# Patient Record
Sex: MALE | Race: WHITE | NOT HISPANIC OR LATINO | URBAN - METROPOLITAN AREA
[De-identification: names, ages, dates, MRNs, and addresses within clinical notes are randomized per-mention and may not be internally consistent; named-entity substitution may affect disease eponyms.]

---

## 2017-02-27 PROBLEM — Z00.00 ENCOUNTER FOR PREVENTIVE HEALTH EXAMINATION: Status: ACTIVE | Noted: 2017-02-27

## 2017-03-03 ENCOUNTER — OUTPATIENT (OUTPATIENT)
Dept: OUTPATIENT SERVICES | Facility: HOSPITAL | Age: 62
LOS: 1 days | Discharge: HOME | End: 2017-03-03

## 2017-03-03 ENCOUNTER — APPOINTMENT (OUTPATIENT)
Dept: HEMATOLOGY ONCOLOGY | Facility: CLINIC | Age: 62
End: 2017-03-03

## 2017-03-03 VITALS
RESPIRATION RATE: 14 BRPM | DIASTOLIC BLOOD PRESSURE: 76 MMHG | SYSTOLIC BLOOD PRESSURE: 124 MMHG | HEIGHT: 71 IN | HEART RATE: 74 BPM | BODY MASS INDEX: 37.8 KG/M2 | WEIGHT: 270 LBS | TEMPERATURE: 96.4 F

## 2017-03-03 DIAGNOSIS — Z86.39 PERSONAL HISTORY OF OTHER ENDOCRINE, NUTRITIONAL AND METABOLIC DISEASE: ICD-10-CM

## 2017-03-03 DIAGNOSIS — C76.0 MALIGNANT NEOPLASM OF HEAD, FACE AND NECK: ICD-10-CM

## 2017-03-03 RX ORDER — SITAGLIPTIN AND METFORMIN HYDROCHLORIDE 50; 500 MG/1; MG/1
50-500 TABLET, FILM COATED ORAL
Refills: 0 | Status: ACTIVE | COMMUNITY

## 2017-03-03 RX ORDER — DEXAMETHASONE 4 MG/1
4 TABLET ORAL DAILY
Qty: 60 | Refills: 3 | Status: ACTIVE | COMMUNITY
Start: 2017-03-03 | End: 1900-01-01

## 2017-03-03 RX ORDER — ONDANSETRON 8 MG/1
8 TABLET, ORALLY DISINTEGRATING ORAL 3 TIMES DAILY
Qty: 30 | Refills: 3 | Status: ACTIVE | COMMUNITY
Start: 2017-03-03 | End: 1900-01-01

## 2017-03-03 RX ORDER — MORPHINE SULFATE 20 MG/5ML
20 SOLUTION ORAL 4 TIMES DAILY
Qty: 30 | Refills: 0 | Status: ACTIVE | COMMUNITY
Start: 2017-03-03 | End: 1900-01-01

## 2017-03-06 ENCOUNTER — OUTPATIENT (OUTPATIENT)
Dept: OUTPATIENT SERVICES | Facility: HOSPITAL | Age: 62
LOS: 1 days | Discharge: HOME | End: 2017-03-06

## 2017-06-27 DIAGNOSIS — R13.12 DYSPHAGIA, OROPHARYNGEAL PHASE: ICD-10-CM

## 2019-09-26 ENCOUNTER — EMERGENCY (EMERGENCY)
Facility: HOSPITAL | Age: 64
LOS: 0 days | Discharge: HOME | End: 2019-09-26
Attending: EMERGENCY MEDICINE | Admitting: EMERGENCY MEDICINE
Payer: MEDICAID

## 2019-09-26 VITALS
TEMPERATURE: 97 F | DIASTOLIC BLOOD PRESSURE: 84 MMHG | OXYGEN SATURATION: 100 % | HEART RATE: 50 BPM | WEIGHT: 179.9 LBS | RESPIRATION RATE: 16 BRPM | SYSTOLIC BLOOD PRESSURE: 181 MMHG

## 2019-09-26 VITALS
SYSTOLIC BLOOD PRESSURE: 174 MMHG | OXYGEN SATURATION: 100 % | TEMPERATURE: 98 F | DIASTOLIC BLOOD PRESSURE: 111 MMHG | HEART RATE: 73 BPM | RESPIRATION RATE: 18 BRPM

## 2019-09-26 DIAGNOSIS — R19.7 DIARRHEA, UNSPECIFIED: ICD-10-CM

## 2019-09-26 DIAGNOSIS — M54.5 LOW BACK PAIN: ICD-10-CM

## 2019-09-26 DIAGNOSIS — R10.9 UNSPECIFIED ABDOMINAL PAIN: ICD-10-CM

## 2019-09-26 LAB
ALBUMIN SERPL ELPH-MCNC: 4.4 G/DL — SIGNIFICANT CHANGE UP (ref 3.5–5.2)
ALP SERPL-CCNC: 69 U/L — SIGNIFICANT CHANGE UP (ref 30–115)
ALT FLD-CCNC: 16 U/L — SIGNIFICANT CHANGE UP (ref 0–41)
ANION GAP SERPL CALC-SCNC: 11 MMOL/L — SIGNIFICANT CHANGE UP (ref 7–14)
APPEARANCE UR: CLEAR — SIGNIFICANT CHANGE UP
AST SERPL-CCNC: 19 U/L — SIGNIFICANT CHANGE UP (ref 0–41)
BASE EXCESS BLDV CALC-SCNC: 3.7 MMOL/L — HIGH (ref -2–2)
BASOPHILS # BLD AUTO: 0.01 K/UL — SIGNIFICANT CHANGE UP (ref 0–0.2)
BASOPHILS NFR BLD AUTO: 0.2 % — SIGNIFICANT CHANGE UP (ref 0–1)
BILIRUB SERPL-MCNC: 0.6 MG/DL — SIGNIFICANT CHANGE UP (ref 0.2–1.2)
BILIRUB UR-MCNC: NEGATIVE — SIGNIFICANT CHANGE UP
BUN SERPL-MCNC: 15 MG/DL — SIGNIFICANT CHANGE UP (ref 10–20)
CA-I SERPL-SCNC: 1.27 MMOL/L — SIGNIFICANT CHANGE UP (ref 1.12–1.3)
CALCIUM SERPL-MCNC: 10.5 MG/DL — HIGH (ref 8.5–10.1)
CHLORIDE SERPL-SCNC: 102 MMOL/L — SIGNIFICANT CHANGE UP (ref 98–110)
CO2 SERPL-SCNC: 28 MMOL/L — SIGNIFICANT CHANGE UP (ref 17–32)
COLOR SPEC: YELLOW — SIGNIFICANT CHANGE UP
CREAT SERPL-MCNC: 1.2 MG/DL — SIGNIFICANT CHANGE UP (ref 0.7–1.5)
DIFF PNL FLD: NEGATIVE — SIGNIFICANT CHANGE UP
EOSINOPHIL # BLD AUTO: 0.01 K/UL — SIGNIFICANT CHANGE UP (ref 0–0.7)
EOSINOPHIL NFR BLD AUTO: 0.2 % — SIGNIFICANT CHANGE UP (ref 0–8)
GAS PNL BLDV: 136 MMOL/L — SIGNIFICANT CHANGE UP (ref 136–145)
GAS PNL BLDV: SIGNIFICANT CHANGE UP
GLUCOSE SERPL-MCNC: 132 MG/DL — HIGH (ref 70–99)
GLUCOSE UR QL: NEGATIVE — SIGNIFICANT CHANGE UP
HCO3 BLDV-SCNC: 32 MMOL/L — HIGH (ref 22–29)
HCT VFR BLD CALC: 42.6 % — SIGNIFICANT CHANGE UP (ref 42–52)
HCT VFR BLDA CALC: 50.8 % — HIGH (ref 34–44)
HGB BLD CALC-MCNC: 16.6 G/DL — SIGNIFICANT CHANGE UP (ref 14–18)
HGB BLD-MCNC: 13.9 G/DL — LOW (ref 14–18)
IMM GRANULOCYTES NFR BLD AUTO: 0.5 % — HIGH (ref 0.1–0.3)
KETONES UR-MCNC: SIGNIFICANT CHANGE UP
LACTATE BLDV-MCNC: 2.3 MMOL/L — HIGH (ref 0.5–1.6)
LEUKOCYTE ESTERASE UR-ACNC: NEGATIVE — SIGNIFICANT CHANGE UP
LIDOCAIN IGE QN: 67 U/L — HIGH (ref 7–60)
LYMPHOCYTES # BLD AUTO: 0.83 K/UL — LOW (ref 1.2–3.4)
LYMPHOCYTES # BLD AUTO: 20.4 % — LOW (ref 20.5–51.1)
MCHC RBC-ENTMCNC: 29.6 PG — SIGNIFICANT CHANGE UP (ref 27–31)
MCHC RBC-ENTMCNC: 32.6 G/DL — SIGNIFICANT CHANGE UP (ref 32–37)
MCV RBC AUTO: 90.8 FL — SIGNIFICANT CHANGE UP (ref 80–94)
MONOCYTES # BLD AUTO: 0.11 K/UL — SIGNIFICANT CHANGE UP (ref 0.1–0.6)
MONOCYTES NFR BLD AUTO: 2.7 % — SIGNIFICANT CHANGE UP (ref 1.7–9.3)
NEUTROPHILS # BLD AUTO: 3.08 K/UL — SIGNIFICANT CHANGE UP (ref 1.4–6.5)
NEUTROPHILS NFR BLD AUTO: 76 % — HIGH (ref 42.2–75.2)
NITRITE UR-MCNC: NEGATIVE — SIGNIFICANT CHANGE UP
NRBC # BLD: 0 /100 WBCS — SIGNIFICANT CHANGE UP (ref 0–0)
PCO2 BLDV: 62 MMHG — HIGH (ref 41–51)
PH BLDV: 7.32 — SIGNIFICANT CHANGE UP (ref 7.26–7.43)
PH UR: 6 — SIGNIFICANT CHANGE UP (ref 5–8)
PLATELET # BLD AUTO: 228 K/UL — SIGNIFICANT CHANGE UP (ref 130–400)
PO2 BLDV: 17 MMHG — LOW (ref 20–40)
POTASSIUM BLDV-SCNC: 4.7 MMOL/L — SIGNIFICANT CHANGE UP (ref 3.3–5.6)
POTASSIUM SERPL-MCNC: 5.7 MMOL/L — HIGH (ref 3.5–5)
POTASSIUM SERPL-SCNC: 5.7 MMOL/L — HIGH (ref 3.5–5)
PROT SERPL-MCNC: 7.1 G/DL — SIGNIFICANT CHANGE UP (ref 6–8)
PROT UR-MCNC: SIGNIFICANT CHANGE UP
RBC # BLD: 4.69 M/UL — LOW (ref 4.7–6.1)
RBC # FLD: 13.4 % — SIGNIFICANT CHANGE UP (ref 11.5–14.5)
SAO2 % BLDV: 20 % — SIGNIFICANT CHANGE UP
SODIUM SERPL-SCNC: 141 MMOL/L — SIGNIFICANT CHANGE UP (ref 135–146)
SP GR SPEC: 1.02 — SIGNIFICANT CHANGE UP (ref 1.01–1.02)
UROBILINOGEN FLD QL: SIGNIFICANT CHANGE UP
WBC # BLD: 4.06 K/UL — LOW (ref 4.8–10.8)
WBC # FLD AUTO: 4.06 K/UL — LOW (ref 4.8–10.8)

## 2019-09-26 PROCEDURE — 99285 EMERGENCY DEPT VISIT HI MDM: CPT

## 2019-09-26 PROCEDURE — 74177 CT ABD & PELVIS W/CONTRAST: CPT | Mod: 26

## 2019-09-26 PROCEDURE — 71045 X-RAY EXAM CHEST 1 VIEW: CPT | Mod: 26

## 2019-09-26 PROCEDURE — 93010 ELECTROCARDIOGRAM REPORT: CPT

## 2019-09-26 RX ORDER — MORPHINE SULFATE 50 MG/1
2 CAPSULE, EXTENDED RELEASE ORAL ONCE
Refills: 0 | Status: DISCONTINUED | OUTPATIENT
Start: 2019-09-26 | End: 2019-09-26

## 2019-09-26 RX ORDER — ACETAMINOPHEN 500 MG
650 TABLET ORAL ONCE
Refills: 0 | Status: COMPLETED | OUTPATIENT
Start: 2019-09-26 | End: 2019-09-26

## 2019-09-26 RX ORDER — METHOCARBAMOL 500 MG/1
2 TABLET, FILM COATED ORAL
Qty: 12 | Refills: 0
Start: 2019-09-26 | End: 2019-09-28

## 2019-09-26 RX ORDER — METHOCARBAMOL 500 MG/1
1500 TABLET, FILM COATED ORAL ONCE
Refills: 0 | Status: COMPLETED | OUTPATIENT
Start: 2019-09-26 | End: 2019-09-26

## 2019-09-26 RX ORDER — KETOROLAC TROMETHAMINE 30 MG/ML
15 SYRINGE (ML) INJECTION ONCE
Refills: 0 | Status: DISCONTINUED | OUTPATIENT
Start: 2019-09-26 | End: 2019-09-26

## 2019-09-26 RX ADMIN — Medication 15 MILLIGRAM(S): at 13:22

## 2019-09-26 RX ADMIN — Medication 650 MILLIGRAM(S): at 12:35

## 2019-09-26 RX ADMIN — Medication 15 MILLIGRAM(S): at 15:43

## 2019-09-26 RX ADMIN — METHOCARBAMOL 1500 MILLIGRAM(S): 500 TABLET, FILM COATED ORAL at 12:35

## 2019-09-26 RX ADMIN — MORPHINE SULFATE 2 MILLIGRAM(S): 50 CAPSULE, EXTENDED RELEASE ORAL at 15:43

## 2019-09-26 RX ADMIN — MORPHINE SULFATE 2 MILLIGRAM(S): 50 CAPSULE, EXTENDED RELEASE ORAL at 15:07

## 2019-09-26 RX ADMIN — Medication 650 MILLIGRAM(S): at 13:17

## 2019-09-26 NOTE — ED PROVIDER NOTE - CLINICAL SUMMARY MEDICAL DECISION MAKING FREE TEXT BOX
Pt here with lower back pain.  no midline vertebral tenderness.  no urinary incontinence.  Pt also with diarrhea.  Motor/sensaiton intact in bilateral lower ext.  no abd pain.  no cp, no sob, no fevers, no chills.  CT negative for anything acute.  Patient feeling better.  Pt dc with outpatient follow up.  Pt understands importance of outpatient follow up.  Pt comfortable with plan and wanted to go home.  pt was feeling much better.

## 2019-09-26 NOTE — ED PROVIDER NOTE - PHYSICAL EXAMINATION
CONSTITUTIONAL: Well-developed; well-nourished; in no acute distress.   SKIN: warm, dry  HEAD: Normocephalic; atraumatic.  EYES: no conjunctival injection. PERRL.   ENT: No nasal discharge; airway clear.  NECK: Supple; non tender.  CARD: S1, S2 normal; no murmurs, gallops, or rubs. Regular rate and rhythm.   RESP: No wheezes, rales or rhonchi.  ABD: soft ntnd  EXT: Normal ROM.  No clubbing, cyanosis or edema.   LYMPH: No acute cervical adenopathy.  NEURO: Alert, oriented, grossly unremarkable. Full sensation in all 4 extremities. Strength in tact in all 4 extremities. Gait normal. Romberg negative. Finger to nose normal.   PSYCH: Cooperative, appropriate.

## 2019-09-26 NOTE — ED PROVIDER NOTE - NS ED ROS FT
Eyes:  No visual changes, eye pain or discharge.  ENMT:  No hearing changes, pain, no sore throat or runny nose, no difficulty swallowing  Cardiac:  No chest pain, SOB or edema. No chest pain with exertion.  Respiratory:  No cough or respiratory distress. No hemoptysis. No history of asthma or RAD.  GI:  No nausea, vomiting, diarrhea or abdominal pain.  :  No dysuria, frequency or burning.  MS:  +back pain   Neuro:  No headache or weakness.  No LOC.  Skin:  No skin rash.   Endocrine: No history of thyroid disease or diabetes.

## 2019-09-26 NOTE — ED PROVIDER NOTE - NSFOLLOWUPCLINICS_GEN_ALL_ED_FT
[facility] ([facility])  Rehabilitation  [facility] [address], [address] [place] [address]  Phone: [phone]  Fax:   Follow Up Time:

## 2019-09-26 NOTE — ED ADULT NURSE REASSESSMENT NOTE - NS ED NURSE REASSESS COMMENT FT1
Pt reassessed A/O times 4 Vs stable report filling slight better VS stable denies chest pain no SOB no N/V dinner tray provide did eat 75% Ed attending aware , on going nursing observation .

## 2019-09-26 NOTE — ED PROVIDER NOTE - ATTENDING CONTRIBUTION TO CARE
65 yo m with pmh of DM presents with 2 days of left lower back pain and diarrhea.  no urinary complaints.  no abd pain, no fevers, no chills. no cp, no sob, no headache, no dizziness, no nausea, no vomiting.  no dysuria.  pt is able to urinate without difficutly and control his urination.  Pt does report frequent urination.  pt also with diarrhea x 2 days.  no fall, no trauma, no injuries.   awake, alert.  abd soft, nontender.  no midline vertebral tenderness.   + left lower back localized muscle tenderness.  motor/sensation intact in bilateral lower ext.  pt breathing comfortably.   Limited rom of left leg due to back pain.  p: ct, labs, supportive care, reassess.

## 2019-09-26 NOTE — ED PROVIDER NOTE - PATIENT PORTAL LINK FT
You can access the FollowMyHealth Patient Portal offered by [facility] by registering at the following website: [email or web address]. By joining [employer]’s FollowMyHealth portal, you will also be able to view your health information using other applications (apps) compatible with our system.

## 2019-09-26 NOTE — ED PROVIDER NOTE - OBJECTIVE STATEMENT
64y M pmh T2D presenting with back pain x1 day. L sided pain, severe, radiates down the leg. 64y M pmh T2D presenting with back pain x1 day. L sided pain, severe, radiates down the L leg. Worse with bending over, better with laying supine. No numbness no tingling. No f/c/n/v. No urinary/bowel incontinence. No saddle anesthesia. No weakness. No trauma/no falls. No urinary symptoms. No hematuria. No abdominal pain. No chest pain/SOB. No hx of kidney stones. No radiation to groin.

## 2020-04-16 PROBLEM — Z00.00 ENCOUNTER FOR PREVENTIVE HEALTH EXAMINATION: Status: ACTIVE | Noted: 2020-04-16

## 2023-11-08 ENCOUNTER — INPATIENT (INPATIENT)
Facility: HOSPITAL | Age: 68
LOS: 2 days | Discharge: HOME | DRG: 176 | End: 2023-11-11
Attending: INTERNAL MEDICINE | Admitting: STUDENT IN AN ORGANIZED HEALTH CARE EDUCATION/TRAINING PROGRAM
Payer: MEDICARE

## 2023-11-08 VITALS
DIASTOLIC BLOOD PRESSURE: 86 MMHG | RESPIRATION RATE: 16 BRPM | TEMPERATURE: 98 F | HEART RATE: 79 BPM | SYSTOLIC BLOOD PRESSURE: 139 MMHG | WEIGHT: 199.96 LBS | OXYGEN SATURATION: 97 %

## 2023-11-08 LAB
ALBUMIN SERPL ELPH-MCNC: 4.5 G/DL — SIGNIFICANT CHANGE UP (ref 3.5–5.2)
ALBUMIN SERPL ELPH-MCNC: 4.5 G/DL — SIGNIFICANT CHANGE UP (ref 3.5–5.2)
ALP SERPL-CCNC: 72 U/L — SIGNIFICANT CHANGE UP (ref 30–115)
ALP SERPL-CCNC: 72 U/L — SIGNIFICANT CHANGE UP (ref 30–115)
ALT FLD-CCNC: 18 U/L — SIGNIFICANT CHANGE UP (ref 0–41)
ALT FLD-CCNC: 18 U/L — SIGNIFICANT CHANGE UP (ref 0–41)
ANION GAP SERPL CALC-SCNC: 16 MMOL/L — HIGH (ref 7–14)
ANION GAP SERPL CALC-SCNC: 16 MMOL/L — HIGH (ref 7–14)
APTT BLD: 27.6 SEC — SIGNIFICANT CHANGE UP (ref 27–39.2)
APTT BLD: 27.6 SEC — SIGNIFICANT CHANGE UP (ref 27–39.2)
AST SERPL-CCNC: 32 U/L — SIGNIFICANT CHANGE UP (ref 0–41)
AST SERPL-CCNC: 32 U/L — SIGNIFICANT CHANGE UP (ref 0–41)
BASE EXCESS BLDV CALC-SCNC: -2.9 MMOL/L — LOW (ref -2–3)
BASE EXCESS BLDV CALC-SCNC: -2.9 MMOL/L — LOW (ref -2–3)
BASOPHILS # BLD AUTO: 0.03 K/UL — SIGNIFICANT CHANGE UP (ref 0–0.2)
BASOPHILS # BLD AUTO: 0.03 K/UL — SIGNIFICANT CHANGE UP (ref 0–0.2)
BASOPHILS NFR BLD AUTO: 0.5 % — SIGNIFICANT CHANGE UP (ref 0–1)
BASOPHILS NFR BLD AUTO: 0.5 % — SIGNIFICANT CHANGE UP (ref 0–1)
BILIRUB SERPL-MCNC: 0.6 MG/DL — SIGNIFICANT CHANGE UP (ref 0.2–1.2)
BILIRUB SERPL-MCNC: 0.6 MG/DL — SIGNIFICANT CHANGE UP (ref 0.2–1.2)
BUN SERPL-MCNC: 21 MG/DL — HIGH (ref 10–20)
BUN SERPL-MCNC: 21 MG/DL — HIGH (ref 10–20)
CA-I SERPL-SCNC: 1.17 MMOL/L — SIGNIFICANT CHANGE UP (ref 1.15–1.33)
CA-I SERPL-SCNC: 1.17 MMOL/L — SIGNIFICANT CHANGE UP (ref 1.15–1.33)
CALCIUM SERPL-MCNC: 9.8 MG/DL — SIGNIFICANT CHANGE UP (ref 8.4–10.5)
CALCIUM SERPL-MCNC: 9.8 MG/DL — SIGNIFICANT CHANGE UP (ref 8.4–10.5)
CHLORIDE SERPL-SCNC: 103 MMOL/L — SIGNIFICANT CHANGE UP (ref 98–110)
CHLORIDE SERPL-SCNC: 103 MMOL/L — SIGNIFICANT CHANGE UP (ref 98–110)
CO2 SERPL-SCNC: 20 MMOL/L — SIGNIFICANT CHANGE UP (ref 17–32)
CO2 SERPL-SCNC: 20 MMOL/L — SIGNIFICANT CHANGE UP (ref 17–32)
CREAT SERPL-MCNC: 1.4 MG/DL — SIGNIFICANT CHANGE UP (ref 0.7–1.5)
CREAT SERPL-MCNC: 1.4 MG/DL — SIGNIFICANT CHANGE UP (ref 0.7–1.5)
EGFR: 55 ML/MIN/1.73M2 — LOW
EGFR: 55 ML/MIN/1.73M2 — LOW
EOSINOPHIL # BLD AUTO: 0.2 K/UL — SIGNIFICANT CHANGE UP (ref 0–0.7)
EOSINOPHIL # BLD AUTO: 0.2 K/UL — SIGNIFICANT CHANGE UP (ref 0–0.7)
EOSINOPHIL NFR BLD AUTO: 3.3 % — SIGNIFICANT CHANGE UP (ref 0–8)
EOSINOPHIL NFR BLD AUTO: 3.3 % — SIGNIFICANT CHANGE UP (ref 0–8)
GAS PNL BLDV: 133 MMOL/L — LOW (ref 136–145)
GAS PNL BLDV: 133 MMOL/L — LOW (ref 136–145)
GAS PNL BLDV: SIGNIFICANT CHANGE UP
GLUCOSE SERPL-MCNC: 129 MG/DL — HIGH (ref 70–99)
GLUCOSE SERPL-MCNC: 129 MG/DL — HIGH (ref 70–99)
HCO3 BLDV-SCNC: 24 MMOL/L — SIGNIFICANT CHANGE UP (ref 22–29)
HCO3 BLDV-SCNC: 24 MMOL/L — SIGNIFICANT CHANGE UP (ref 22–29)
HCT VFR BLD CALC: 44.2 % — SIGNIFICANT CHANGE UP (ref 42–52)
HCT VFR BLD CALC: 44.2 % — SIGNIFICANT CHANGE UP (ref 42–52)
HCT VFR BLDA CALC: 40 % — SIGNIFICANT CHANGE UP (ref 39–51)
HCT VFR BLDA CALC: 40 % — SIGNIFICANT CHANGE UP (ref 39–51)
HGB BLD CALC-MCNC: 13.4 G/DL — SIGNIFICANT CHANGE UP (ref 12.6–17.4)
HGB BLD CALC-MCNC: 13.4 G/DL — SIGNIFICANT CHANGE UP (ref 12.6–17.4)
HGB BLD-MCNC: 14.6 G/DL — SIGNIFICANT CHANGE UP (ref 14–18)
HGB BLD-MCNC: 14.6 G/DL — SIGNIFICANT CHANGE UP (ref 14–18)
IMM GRANULOCYTES NFR BLD AUTO: 0.3 % — SIGNIFICANT CHANGE UP (ref 0.1–0.3)
IMM GRANULOCYTES NFR BLD AUTO: 0.3 % — SIGNIFICANT CHANGE UP (ref 0.1–0.3)
INR BLD: 1.03 RATIO — SIGNIFICANT CHANGE UP (ref 0.65–1.3)
INR BLD: 1.03 RATIO — SIGNIFICANT CHANGE UP (ref 0.65–1.3)
LACTATE BLDV-MCNC: 3.5 MMOL/L — HIGH (ref 0.5–2)
LACTATE BLDV-MCNC: 3.5 MMOL/L — HIGH (ref 0.5–2)
LACTATE SERPL-SCNC: 3.6 MMOL/L — HIGH (ref 0.7–2)
LACTATE SERPL-SCNC: 3.6 MMOL/L — HIGH (ref 0.7–2)
LIDOCAIN IGE QN: 67 U/L — HIGH (ref 7–60)
LIDOCAIN IGE QN: 67 U/L — HIGH (ref 7–60)
LYMPHOCYTES # BLD AUTO: 1.63 K/UL — SIGNIFICANT CHANGE UP (ref 1.2–3.4)
LYMPHOCYTES # BLD AUTO: 1.63 K/UL — SIGNIFICANT CHANGE UP (ref 1.2–3.4)
LYMPHOCYTES # BLD AUTO: 27.2 % — SIGNIFICANT CHANGE UP (ref 20.5–51.1)
LYMPHOCYTES # BLD AUTO: 27.2 % — SIGNIFICANT CHANGE UP (ref 20.5–51.1)
MAGNESIUM SERPL-MCNC: 1.6 MG/DL — LOW (ref 1.8–2.4)
MAGNESIUM SERPL-MCNC: 1.6 MG/DL — LOW (ref 1.8–2.4)
MCHC RBC-ENTMCNC: 30.5 PG — SIGNIFICANT CHANGE UP (ref 27–31)
MCHC RBC-ENTMCNC: 30.5 PG — SIGNIFICANT CHANGE UP (ref 27–31)
MCHC RBC-ENTMCNC: 33 G/DL — SIGNIFICANT CHANGE UP (ref 32–37)
MCHC RBC-ENTMCNC: 33 G/DL — SIGNIFICANT CHANGE UP (ref 32–37)
MCV RBC AUTO: 92.5 FL — SIGNIFICANT CHANGE UP (ref 80–94)
MCV RBC AUTO: 92.5 FL — SIGNIFICANT CHANGE UP (ref 80–94)
MONOCYTES # BLD AUTO: 0.42 K/UL — SIGNIFICANT CHANGE UP (ref 0.1–0.6)
MONOCYTES # BLD AUTO: 0.42 K/UL — SIGNIFICANT CHANGE UP (ref 0.1–0.6)
MONOCYTES NFR BLD AUTO: 7 % — SIGNIFICANT CHANGE UP (ref 1.7–9.3)
MONOCYTES NFR BLD AUTO: 7 % — SIGNIFICANT CHANGE UP (ref 1.7–9.3)
NEUTROPHILS # BLD AUTO: 3.69 K/UL — SIGNIFICANT CHANGE UP (ref 1.4–6.5)
NEUTROPHILS # BLD AUTO: 3.69 K/UL — SIGNIFICANT CHANGE UP (ref 1.4–6.5)
NEUTROPHILS NFR BLD AUTO: 61.7 % — SIGNIFICANT CHANGE UP (ref 42.2–75.2)
NEUTROPHILS NFR BLD AUTO: 61.7 % — SIGNIFICANT CHANGE UP (ref 42.2–75.2)
NRBC # BLD: 0 /100 WBCS — SIGNIFICANT CHANGE UP (ref 0–0)
NRBC # BLD: 0 /100 WBCS — SIGNIFICANT CHANGE UP (ref 0–0)
NT-PROBNP SERPL-SCNC: 50 PG/ML — SIGNIFICANT CHANGE UP (ref 0–300)
NT-PROBNP SERPL-SCNC: 50 PG/ML — SIGNIFICANT CHANGE UP (ref 0–300)
PCO2 BLDV: 52 MMHG — SIGNIFICANT CHANGE UP (ref 42–55)
PCO2 BLDV: 52 MMHG — SIGNIFICANT CHANGE UP (ref 42–55)
PH BLDV: 7.28 — LOW (ref 7.32–7.43)
PH BLDV: 7.28 — LOW (ref 7.32–7.43)
PLATELET # BLD AUTO: 155 K/UL — SIGNIFICANT CHANGE UP (ref 130–400)
PLATELET # BLD AUTO: 155 K/UL — SIGNIFICANT CHANGE UP (ref 130–400)
PMV BLD: 10.2 FL — SIGNIFICANT CHANGE UP (ref 7.4–10.4)
PMV BLD: 10.2 FL — SIGNIFICANT CHANGE UP (ref 7.4–10.4)
PO2 BLDV: 31 MMHG — SIGNIFICANT CHANGE UP
PO2 BLDV: 31 MMHG — SIGNIFICANT CHANGE UP
POTASSIUM BLDV-SCNC: 5 MMOL/L — SIGNIFICANT CHANGE UP (ref 3.5–5.1)
POTASSIUM BLDV-SCNC: 5 MMOL/L — SIGNIFICANT CHANGE UP (ref 3.5–5.1)
POTASSIUM SERPL-MCNC: 5.9 MMOL/L — HIGH (ref 3.5–5)
POTASSIUM SERPL-MCNC: 5.9 MMOL/L — HIGH (ref 3.5–5)
POTASSIUM SERPL-SCNC: 5.9 MMOL/L — HIGH (ref 3.5–5)
POTASSIUM SERPL-SCNC: 5.9 MMOL/L — HIGH (ref 3.5–5)
PROT SERPL-MCNC: 7.5 G/DL — SIGNIFICANT CHANGE UP (ref 6–8)
PROT SERPL-MCNC: 7.5 G/DL — SIGNIFICANT CHANGE UP (ref 6–8)
PROTHROM AB SERPL-ACNC: 11.7 SEC — SIGNIFICANT CHANGE UP (ref 9.95–12.87)
PROTHROM AB SERPL-ACNC: 11.7 SEC — SIGNIFICANT CHANGE UP (ref 9.95–12.87)
RBC # BLD: 4.78 M/UL — SIGNIFICANT CHANGE UP (ref 4.7–6.1)
RBC # BLD: 4.78 M/UL — SIGNIFICANT CHANGE UP (ref 4.7–6.1)
RBC # FLD: 13.4 % — SIGNIFICANT CHANGE UP (ref 11.5–14.5)
RBC # FLD: 13.4 % — SIGNIFICANT CHANGE UP (ref 11.5–14.5)
SAO2 % BLDV: 49 % — SIGNIFICANT CHANGE UP
SAO2 % BLDV: 49 % — SIGNIFICANT CHANGE UP
SODIUM SERPL-SCNC: 139 MMOL/L — SIGNIFICANT CHANGE UP (ref 135–146)
SODIUM SERPL-SCNC: 139 MMOL/L — SIGNIFICANT CHANGE UP (ref 135–146)
TROPONIN T SERPL-MCNC: <0.01 NG/ML — SIGNIFICANT CHANGE UP
TROPONIN T SERPL-MCNC: <0.01 NG/ML — SIGNIFICANT CHANGE UP
WBC # BLD: 5.99 K/UL — SIGNIFICANT CHANGE UP (ref 4.8–10.8)
WBC # BLD: 5.99 K/UL — SIGNIFICANT CHANGE UP (ref 4.8–10.8)
WBC # FLD AUTO: 5.99 K/UL — SIGNIFICANT CHANGE UP (ref 4.8–10.8)
WBC # FLD AUTO: 5.99 K/UL — SIGNIFICANT CHANGE UP (ref 4.8–10.8)

## 2023-11-08 PROCEDURE — 99291 CRITICAL CARE FIRST HOUR: CPT

## 2023-11-08 PROCEDURE — 93970 EXTREMITY STUDY: CPT | Mod: 26

## 2023-11-08 PROCEDURE — 71275 CT ANGIOGRAPHY CHEST: CPT | Mod: 26,MA

## 2023-11-08 PROCEDURE — 71045 X-RAY EXAM CHEST 1 VIEW: CPT | Mod: 26

## 2023-11-08 PROCEDURE — 74177 CT ABD & PELVIS W/CONTRAST: CPT | Mod: 26,MA

## 2023-11-08 RX ORDER — SODIUM CHLORIDE 9 MG/ML
1000 INJECTION INTRAMUSCULAR; INTRAVENOUS; SUBCUTANEOUS ONCE
Refills: 0 | Status: COMPLETED | OUTPATIENT
Start: 2023-11-08 | End: 2023-11-08

## 2023-11-08 RX ORDER — HEPARIN SODIUM 5000 [USP'U]/ML
3500 INJECTION INTRAVENOUS; SUBCUTANEOUS EVERY 6 HOURS
Refills: 0 | Status: DISCONTINUED | OUTPATIENT
Start: 2023-11-08 | End: 2023-11-09

## 2023-11-08 RX ORDER — MORPHINE SULFATE 50 MG/1
4 CAPSULE, EXTENDED RELEASE ORAL ONCE
Refills: 0 | Status: DISCONTINUED | OUTPATIENT
Start: 2023-11-08 | End: 2023-11-09

## 2023-11-08 RX ORDER — HEPARIN SODIUM 5000 [USP'U]/ML
INJECTION INTRAVENOUS; SUBCUTANEOUS
Qty: 25000 | Refills: 0 | Status: DISCONTINUED | OUTPATIENT
Start: 2023-11-08 | End: 2023-11-09

## 2023-11-08 RX ORDER — HEPARIN SODIUM 5000 [USP'U]/ML
7500 INJECTION INTRAVENOUS; SUBCUTANEOUS EVERY 6 HOURS
Refills: 0 | Status: DISCONTINUED | OUTPATIENT
Start: 2023-11-08 | End: 2023-11-09

## 2023-11-08 RX ORDER — MAGNESIUM SULFATE 500 MG/ML
2 VIAL (ML) INJECTION ONCE
Refills: 0 | Status: COMPLETED | OUTPATIENT
Start: 2023-11-08 | End: 2023-11-09

## 2023-11-08 RX ORDER — ONDANSETRON 8 MG/1
4 TABLET, FILM COATED ORAL ONCE
Refills: 0 | Status: DISCONTINUED | OUTPATIENT
Start: 2023-11-08 | End: 2023-11-11

## 2023-11-08 RX ORDER — KETOROLAC TROMETHAMINE 30 MG/ML
30 SYRINGE (ML) INJECTION ONCE
Refills: 0 | Status: DISCONTINUED | OUTPATIENT
Start: 2023-11-08 | End: 2023-11-08

## 2023-11-08 RX ORDER — HEPARIN SODIUM 5000 [USP'U]/ML
7500 INJECTION INTRAVENOUS; SUBCUTANEOUS ONCE
Refills: 0 | Status: COMPLETED | OUTPATIENT
Start: 2023-11-08 | End: 2023-11-08

## 2023-11-08 RX ADMIN — HEPARIN SODIUM 1700 UNIT(S)/HR: 5000 INJECTION INTRAVENOUS; SUBCUTANEOUS at 23:03

## 2023-11-08 RX ADMIN — SODIUM CHLORIDE 1000 MILLILITER(S): 9 INJECTION INTRAMUSCULAR; INTRAVENOUS; SUBCUTANEOUS at 22:06

## 2023-11-08 RX ADMIN — HEPARIN SODIUM 7500 UNIT(S): 5000 INJECTION INTRAVENOUS; SUBCUTANEOUS at 23:03

## 2023-11-08 RX ADMIN — SODIUM CHLORIDE 1000 MILLILITER(S): 9 INJECTION INTRAMUSCULAR; INTRAVENOUS; SUBCUTANEOUS at 19:37

## 2023-11-08 RX ADMIN — Medication 30 MILLIGRAM(S): at 19:36

## 2023-11-08 NOTE — ED PROVIDER NOTE - PHYSICAL EXAMINATION
VITAL SIGNS: I have reviewed nursing notes and confirm.  CONSTITUTIONAL:  in no acute distress. appears uncomfortable  SKIN: Skin exam is warm and dry, no acute rash.  HEAD: Normocephalic; atraumatic.  EYES: PERRL, EOM intact; conjunctiva and sclera clear.  ENT: No nasal discharge; airway clear.  NECK: Supple; non tender.  CARD: S1, S2 normal; no murmurs, gallops, or rubs. Regular rate and rhythm.  RESP: No wheezes, rales or rhonchi. Speaking in full sentences.   ABD: Normal bowel sounds; soft; non-distended; non-tender; No rebound or guarding. No CVA tenderness.  EXT: Normal ROM. Left lower leg edema. pedal pulse present   NEURO: Alert, oriented. Grossly unremarkable. No focal deficits.

## 2023-11-08 NOTE — ED ADULT NURSE NOTE - OBJECTIVE STATEMENT
Pt aox4, presented to the ED w/ c/o abd. pain located on left side. Pmhx of gout, dm. Denies any n/v. NKA. Family member present at bedside.

## 2023-11-08 NOTE — ED PROVIDER NOTE - ATTENDING APP SHARED VISIT CONTRIBUTION OF CARE
68-year-old man with history of gout, DM, in ER with complaint of L flank pain which started ~2 AM today.  Patient describes sharp pain to left upper flank, nonradiating, constant.  Denies any dysuria/hematuria/frequency.  No F/C.  No CP.  Patient states pain worse with deep breaths but denies any SOB.  No HA/dizziness.  Patient also states he has been having L foot/ankle swelling for the past ~2 months.  Was seen by his PMD, had negative duplex a month and a half ago, foot swelling attributed to gout.  PE - nad, nc/at, eomi, perrl, op - clear, mmm, neck supple, cta b/l, no w/r/r, rrr, abd- soft, nt/nd, nabs, + L CVAT,  from x 4, LLE: + swelling to lower leg/ankle/foot, 2+ DP pulse, sensation intact, cap refill < 2 secs, A&O x 3, cn 2-12 intact, no focal motor/sensory deficits.

## 2023-11-08 NOTE — CONSULT NOTE ADULT - SUBJECTIVE AND OBJECTIVE BOX
Vascular Surgery Consult  HPI:  A 68-year-old male with PMHx of gout and DM presenting to ED for left-sided flank pain since 3 AM last night. The pain is sharp and intermittent on his left side worse with certain movements. He said the pain is radiating down his lower abdomen. No associated nausea, vomiting, fever, chills, back pain, SOB, CP, smoking, or recent travel. He was incidentally diagnosed to have a left lower lobe pulmonary embolism on CT scan of the abdomen and pelvis. He also complaints of left leg swelling for the past two months.     Vascular surgery was consulted for suspected DVT in the left lower extremity in the setting of left lower lobe PE, and left leg swelling since the last 2 months.     MEDICATIONS:  heparin   Injectable 3500 Unit(s) IV Push every 6 hours PRN  heparin   Injectable 7500 Unit(s) IV Push every 6 hours PRN  heparin  Infusion.  Unit(s)/Hr IV Continuous <Continuous>  magnesium sulfate  IVPB 2 Gram(s) IV Intermittent Once  morphine  - Injectable 4 milliGRAM(s) IV Push Once  ondansetron Injectable 4 milliGRAM(s) IV Push once  sodium chloride 0.9% Bolus 1000 milliLiter(s) IV Bolus once    ALLERGIES:  No Known Allergies    VITALS & I/Os:  Vital Signs Last 24 Hrs  T(C): 36.7 (08 Nov 2023 18:15), Max: 36.7 (08 Nov 2023 18:15)  T(F): 98 (08 Nov 2023 18:15), Max: 98 (08 Nov 2023 18:15)  HR: 79 (08 Nov 2023 18:15) (79 - 79)  BP: 139/86 (08 Nov 2023 18:15) (139/86 - 139/86)  RR: 16 (08 Nov 2023 18:15) (16 - 16)  SpO2: 97% (08 Nov 2023 18:15) (97% - 97%)    Parameters below as of 08 Nov 2023 18:15  Patient On (Oxygen Delivery Method): room air    PHYSICAL EXAM:  General: No acute distress  Respiratory: Nonlabored  Cardiovascular: RRR  Abdominal: Soft, nondistended, nontender. No rebound or guarding. No organomegaly, no palpable mass.  Extremities: Warm  Vascular:  - RUE:  - LUE:  - RLE:  - LLE:       LABS:                        14.6   5.99  )-----------( 155      ( 08 Nov 2023 19:26 )             44.2     11-08    139  |  103  |  21<H>  ----------------------------<  129<H>  5.9<H>   |  20  |  1.4    Ca    9.8      08 Nov 2023 19:26  Mg     1.6     11-08    TPro  7.5  /  Alb  4.5  /  TBili  0.6  /  DBili  x   /  AST  32  /  ALT  18  /  AlkPhos  72  11-08    Lactate: Lactate, Blood: 3.6 mmol/L (11-08 @ 19:26)   11-08 @ 22:13  3.50    PT/INR - ( 08 Nov 2023 21:46 )   PT: 11.70 sec;   INR: 1.03 ratio         PTT - ( 08 Nov 2023 21:46 )  PTT:27.6 sec    CARDIAC MARKERS ( 08 Nov 2023 21:46 )  x     / <0.01 ng/mL / x     / x     / x            Urinalysis Basic - ( 08 Nov 2023 19:26 )    Color: x / Appearance: x / SG: x / pH: x  Gluc: 129 mg/dL / Ketone: x  / Bili: x / Urobili: x   Blood: x / Protein: x / Nitrite: x   Leuk Esterase: x / RBC: x / WBC x   Sq Epi: x / Non Sq Epi: x / Bacteria: x    IMAGING:  < from: CT Abdomen and Pelvis w/ IV Cont (11.08.23 @ 19:47) >  IMPRESSION:  Incidentally detected left lower lobe pulmonary emboli. Consider   dedicated dedicated CTA chestfor further evaluation.  No CT evidence of acute intra-abdominal pathology. No nephrolithiasis or   hydronephrosis bilaterally.                                                                                               Vascular Surgery Consult  HPI:  A 68-year-old male with PMHx of gout and DM presenting to ED for left-sided flank pain since 3 AM last night. The pain is sharp and intermittent on his left side worse with certain movements. He said the pain is radiating down his lower abdomen. No associated nausea, vomiting, fever, chills, back pain, SOB, CP, smoking, or recent travel. He was incidentally diagnosed to have a left lower lobe pulmonary embolism on CT scan of the abdomen and pelvis. He also complaints of left leg swelling for the past two months.     Vascular surgery was consulted for suspected DVT in the left lower extremity in the setting of left lower lobe PE, and left leg swelling since the last 2 months.     MEDICATIONS:  heparin   Injectable 3500 Unit(s) IV Push every 6 hours PRN  heparin   Injectable 7500 Unit(s) IV Push every 6 hours PRN  heparin  Infusion.  Unit(s)/Hr IV Continuous <Continuous>  magnesium sulfate  IVPB 2 Gram(s) IV Intermittent Once  morphine  - Injectable 4 milliGRAM(s) IV Push Once  ondansetron Injectable 4 milliGRAM(s) IV Push once  sodium chloride 0.9% Bolus 1000 milliLiter(s) IV Bolus once    ALLERGIES:  No Known Allergies    VITALS & I/Os:  Vital Signs Last 24 Hrs  T(C): 36.7 (08 Nov 2023 18:15), Max: 36.7 (08 Nov 2023 18:15)  T(F): 98 (08 Nov 2023 18:15), Max: 98 (08 Nov 2023 18:15)  HR: 79 (08 Nov 2023 18:15) (79 - 79)  BP: 139/86 (08 Nov 2023 18:15) (139/86 - 139/86)  RR: 16 (08 Nov 2023 18:15) (16 - 16)  SpO2: 97% (08 Nov 2023 18:15) (97% - 97%)    Parameters below as of 08 Nov 2023 18:15  Patient On (Oxygen Delivery Method): room air    PHYSICAL EXAM:  General: No acute distress  Respiratory: Nonlabored  Cardiovascular: RRR  Abdominal: Soft, nondistended, nontender. No rebound or guarding. No organomegaly, no palpable mass.  Extremities: Warm, Left lower extremity swollen more than the right  Vascular:  - RUE: palpable pulses  - LUE: palpable pulses  - RLE: palpable pulses  - LLE: palpable pulses      LABS:                        14.6   5.99  )-----------( 155      ( 08 Nov 2023 19:26 )             44.2     11-08    139  |  103  |  21<H>  ----------------------------<  129<H>  5.9<H>   |  20  |  1.4    Ca    9.8      08 Nov 2023 19:26  Mg     1.6     11-08    TPro  7.5  /  Alb  4.5  /  TBili  0.6  /  DBili  x   /  AST  32  /  ALT  18  /  AlkPhos  72  11-08    Lactate: Lactate, Blood: 3.6 mmol/L (11-08 @ 19:26)   11-08 @ 22:13  3.50    PT/INR - ( 08 Nov 2023 21:46 )   PT: 11.70 sec;   INR: 1.03 ratio         PTT - ( 08 Nov 2023 21:46 )  PTT:27.6 sec    CARDIAC MARKERS ( 08 Nov 2023 21:46 )  x     / <0.01 ng/mL / x     / x     / x            Urinalysis Basic - ( 08 Nov 2023 19:26 )    Color: x / Appearance: x / SG: x / pH: x  Gluc: 129 mg/dL / Ketone: x  / Bili: x / Urobili: x   Blood: x / Protein: x / Nitrite: x   Leuk Esterase: x / RBC: x / WBC x   Sq Epi: x / Non Sq Epi: x / Bacteria: x    IMAGING:  < from: CT Abdomen and Pelvis w/ IV Cont (11.08.23 @ 19:47) >  IMPRESSION:  Incidentally detected left lower lobe pulmonary emboli. Consider   dedicated dedicated CTA chestfor further evaluation.  No CT evidence of acute intra-abdominal pathology. No nephrolithiasis or   hydronephrosis bilaterally.

## 2023-11-08 NOTE — CONSULT NOTE ADULT - ATTENDING COMMENTS
I personally reviewed the venous duplex- there is DVT in left CFV and distal veins. Patient has swelling for 2 months and has symptomatic PE.  Would recommend therapeutic anticoagulation.  No need for intervention on LLE at this time.  Need to be seen by IR for PE.

## 2023-11-08 NOTE — ED PROVIDER NOTE - CRITICAL CARE ATTENDING CONTRIBUTION TO CARE
I personally evaluated the patient. I reviewed the Resident’s or Physician Assistant’s note (as assigned above), and agree with the findings and plan except as documented in my note.      68-year-old man with history of gout, DM, in ER with complaint of L flank pain which started ~2 AM today.  Patient describes sharp pain to left upper flank, nonradiating, constant.  Denies any dysuria/hematuria/frequency.  No F/C.  No CP.  Patient states pain worse with deep breaths but denies any SOB.  No HA/dizziness.  Patient also states he has been having L foot/ankle swelling for the past ~2 months.  Was seen by his PMD, had negative duplex a month and a half ago, foot swelling attributed to gout.  PE - nad, nc/at, eomi, perrl, op - clear, mmm, neck supple, cta b/l, no w/r/r, rrr, abd- soft, nt/nd, nabs, + L CVAT,  from x 4, LLE: + swelling to lower leg/ankle/foot, 2+ DP pulse, sensation intact, cap refill < 2 secs, A&O x 3, cn 2-12 intact, no focal motor/sensory deficits.

## 2023-11-08 NOTE — CONSULT NOTE ADULT - ASSESSMENT
ASSESSMENT:  A 68-year-old male with PMHx of gout and DM presenting to ED for left-sided flank pain since 3 AM last night. The pain is sharp and intermittent on his left side worse with certain movements. He said the pain is radiating down his lower abdomen. No associated nausea, vomiting, fever, chills, back pain, SOB, CP, smoking, or recent travel. He was incidentally diagnosed to have a left lower lobe pulmonary embolism on CT scan of the abdomen and pelvis. He also complaints of left leg swelling for the past two months.     Vascular surgery was consulted for suspected DVT in the left lower extremity in the setting of left lower lobe PE, and left leg swelling since the last 2 months.     PLAN:  - F/U final read for the bilateral lower extremity venous duplex  - Start anticoagulation  - Recommend IR consult for left lower lobe pulmonary embolism     ASSESSMENT:  A 68-year-old male with PMHx of gout and DM presenting to ED for left-sided flank pain since 3 AM last night. The pain is sharp and intermittent on his left side worse with certain movements. He said the pain is radiating down his lower abdomen. No associated nausea, vomiting, fever, chills, back pain, SOB, CP, smoking, or recent travel. He was incidentally diagnosed to have a left lower lobe pulmonary embolism on CT scan of the abdomen and pelvis. He also complaints of left leg swelling for the past two months.     Vascular surgery was consulted for suspected DVT in the left lower extremity in the setting of left lower lobe PE, and left leg swelling since the last 2 months.     PLAN:  - No vascular intervention indicated at this time  - Anticoagulation for 6 months (Eliquis 10 BID for 7 days followed by Eliquis 5 BID for a total of 6 months)  - Recommend IR consult for left lower lobe pulmonary embolism  - Plan discussed with Fellow, Dr. Irene and Attending, Dr. Disla    [phone]     ASSESSMENT:  A 68-year-old male with PMHx of gout and DM presenting to ED for left-sided flank pain since 3 AM last night. The pain is sharp and intermittent on his left side worse with certain movements. He said the pain is radiating down his lower abdomen. No associated nausea, vomiting, fever, chills, back pain, SOB, CP, smoking, or recent travel. He was incidentally diagnosed to have a left lower lobe pulmonary embolism on CT scan of the abdomen and pelvis. He also complaints of left leg swelling for the past two months.     Vascular surgery was consulted for suspected DVT in the left lower extremity in the setting of left lower lobe PE, and left leg swelling since the last 2 months.     PLAN:  - No vascular intervention indicated at this time  - Anticoagulation for 6 months (Eliquis 10 BID for 7 days followed by Eliquis 5 BID for a total of 6 months)  - Recommend IR consult for left lower lobe pulmonary embolism  - Plan discussed with Attending, Dr. Fuller    [phone]

## 2023-11-08 NOTE — ED PROVIDER NOTE - NS ED ATTENDING STATEMENT MOD
This was a shared visit with the ZAKI. I reviewed and verified the documentation and independently performed the documented: I have personally provided the amount of critical care time documented below excluding time spent on separate procedures.

## 2023-11-08 NOTE — ED ADULT NURSE NOTE - NSFALLUNIVINTERV_ED_ALL_ED
Bed/Stretcher in lowest position, wheels locked, appropriate side rails in place/Call bell, personal items and telephone in reach/Instruct patient to call for assistance before getting out of bed/chair/stretcher/Non-slip footwear applied when patient is off stretcher/[place] to call system/Physically safe environment - no spills, clutter or unnecessary equipment/Purposeful proactive rounding/Room/bathroom lighting operational, light cord in reach

## 2023-11-08 NOTE — ED PROVIDER NOTE - OBJECTIVE STATEMENT
68-year-old male history of gout, DM presenting to ED for left-sided flank pain since 3 AM last night describes the pain as sharp intermittent on his left side made worse with certain movements.  He said the pain is radiating down his lower abdomen.  No associated nausea, vomiting, fever, chills, back pain, SOB, CP, smoking, recent travel

## 2023-11-08 NOTE — ED PROVIDER NOTE - PROGRESS NOTE DETAILS
LE duplex: + LLE DVT from prox femoral.  Pt on heparin.  vascular to come and eval.  CTA chest pending. Labs reviewed: CBC unremarkable, CMP with hemolyzed K+ (5.9), lactate 3.6.  CT abdomen: No intra-abdominal pathology, incidentally noted L lower lobe pulm PE.  Heparin drip started, CTA chest ordered, LE duplex ordered. CTA chest: Acute L upper and lower lobar and segmental PE.  No evidence of R heart strain.  Troponin/BNP negative.  Repeat vitals: HR 62, /62, O2 sat 97% on RA.  Patient admitted to medicine for further treatment/evaluation of PE/DVT, on heparin drip

## 2023-11-09 DIAGNOSIS — I26.99 OTHER PULMONARY EMBOLISM WITHOUT ACUTE COR PULMONALE: ICD-10-CM

## 2023-11-09 DIAGNOSIS — R09.89 OTHER SPECIFIED SYMPTOMS AND SIGNS INVOLVING THE CIRCULATORY AND RESPIRATORY SYSTEMS: ICD-10-CM

## 2023-11-09 LAB
ANION GAP SERPL CALC-SCNC: 10 MMOL/L — SIGNIFICANT CHANGE UP (ref 7–14)
ANION GAP SERPL CALC-SCNC: 10 MMOL/L — SIGNIFICANT CHANGE UP (ref 7–14)
APTT BLD: 142.7 SEC — CRITICAL HIGH (ref 27–39.2)
APTT BLD: 142.7 SEC — CRITICAL HIGH (ref 27–39.2)
APTT BLD: 31.9 SEC — SIGNIFICANT CHANGE UP (ref 27–39.2)
APTT BLD: 31.9 SEC — SIGNIFICANT CHANGE UP (ref 27–39.2)
APTT BLD: 70.3 SEC — CRITICAL HIGH (ref 27–39.2)
APTT BLD: 70.3 SEC — CRITICAL HIGH (ref 27–39.2)
BASOPHILS # BLD AUTO: 0.03 K/UL — SIGNIFICANT CHANGE UP (ref 0–0.2)
BASOPHILS # BLD AUTO: 0.03 K/UL — SIGNIFICANT CHANGE UP (ref 0–0.2)
BASOPHILS NFR BLD AUTO: 0.6 % — SIGNIFICANT CHANGE UP (ref 0–1)
BASOPHILS NFR BLD AUTO: 0.6 % — SIGNIFICANT CHANGE UP (ref 0–1)
BUN SERPL-MCNC: 22 MG/DL — HIGH (ref 10–20)
BUN SERPL-MCNC: 22 MG/DL — HIGH (ref 10–20)
CALCIUM SERPL-MCNC: 9.2 MG/DL — SIGNIFICANT CHANGE UP (ref 8.4–10.5)
CALCIUM SERPL-MCNC: 9.2 MG/DL — SIGNIFICANT CHANGE UP (ref 8.4–10.5)
CHLORIDE SERPL-SCNC: 105 MMOL/L — SIGNIFICANT CHANGE UP (ref 98–110)
CHLORIDE SERPL-SCNC: 105 MMOL/L — SIGNIFICANT CHANGE UP (ref 98–110)
CO2 SERPL-SCNC: 21 MMOL/L — SIGNIFICANT CHANGE UP (ref 17–32)
CO2 SERPL-SCNC: 21 MMOL/L — SIGNIFICANT CHANGE UP (ref 17–32)
CREAT SERPL-MCNC: 1.3 MG/DL — SIGNIFICANT CHANGE UP (ref 0.7–1.5)
CREAT SERPL-MCNC: 1.3 MG/DL — SIGNIFICANT CHANGE UP (ref 0.7–1.5)
EGFR: 61 ML/MIN/1.73M2 — SIGNIFICANT CHANGE UP
EGFR: 61 ML/MIN/1.73M2 — SIGNIFICANT CHANGE UP
EOSINOPHIL # BLD AUTO: 0.21 K/UL — SIGNIFICANT CHANGE UP (ref 0–0.7)
EOSINOPHIL # BLD AUTO: 0.21 K/UL — SIGNIFICANT CHANGE UP (ref 0–0.7)
EOSINOPHIL NFR BLD AUTO: 4.1 % — SIGNIFICANT CHANGE UP (ref 0–8)
EOSINOPHIL NFR BLD AUTO: 4.1 % — SIGNIFICANT CHANGE UP (ref 0–8)
GLUCOSE BLDC GLUCOMTR-MCNC: 89 MG/DL — SIGNIFICANT CHANGE UP (ref 70–99)
GLUCOSE BLDC GLUCOMTR-MCNC: 89 MG/DL — SIGNIFICANT CHANGE UP (ref 70–99)
GLUCOSE SERPL-MCNC: 96 MG/DL — SIGNIFICANT CHANGE UP (ref 70–99)
GLUCOSE SERPL-MCNC: 96 MG/DL — SIGNIFICANT CHANGE UP (ref 70–99)
HCT VFR BLD CALC: 42.2 % — SIGNIFICANT CHANGE UP (ref 42–52)
HCT VFR BLD CALC: 42.2 % — SIGNIFICANT CHANGE UP (ref 42–52)
HCT VFR BLD CALC: 42.9 % — SIGNIFICANT CHANGE UP (ref 42–52)
HCT VFR BLD CALC: 42.9 % — SIGNIFICANT CHANGE UP (ref 42–52)
HGB BLD-MCNC: 14 G/DL — SIGNIFICANT CHANGE UP (ref 14–18)
IMM GRANULOCYTES NFR BLD AUTO: 0.4 % — HIGH (ref 0.1–0.3)
IMM GRANULOCYTES NFR BLD AUTO: 0.4 % — HIGH (ref 0.1–0.3)
LACTATE SERPL-SCNC: 1.3 MMOL/L — SIGNIFICANT CHANGE UP (ref 0.7–2)
LACTATE SERPL-SCNC: 1.3 MMOL/L — SIGNIFICANT CHANGE UP (ref 0.7–2)
LYMPHOCYTES # BLD AUTO: 1.24 K/UL — SIGNIFICANT CHANGE UP (ref 1.2–3.4)
LYMPHOCYTES # BLD AUTO: 1.24 K/UL — SIGNIFICANT CHANGE UP (ref 1.2–3.4)
LYMPHOCYTES # BLD AUTO: 24.3 % — SIGNIFICANT CHANGE UP (ref 20.5–51.1)
LYMPHOCYTES # BLD AUTO: 24.3 % — SIGNIFICANT CHANGE UP (ref 20.5–51.1)
MAGNESIUM SERPL-MCNC: 2.2 MG/DL — SIGNIFICANT CHANGE UP (ref 1.8–2.4)
MAGNESIUM SERPL-MCNC: 2.2 MG/DL — SIGNIFICANT CHANGE UP (ref 1.8–2.4)
MCHC RBC-ENTMCNC: 30 PG — SIGNIFICANT CHANGE UP (ref 27–31)
MCHC RBC-ENTMCNC: 30 PG — SIGNIFICANT CHANGE UP (ref 27–31)
MCHC RBC-ENTMCNC: 30.1 PG — SIGNIFICANT CHANGE UP (ref 27–31)
MCHC RBC-ENTMCNC: 30.1 PG — SIGNIFICANT CHANGE UP (ref 27–31)
MCHC RBC-ENTMCNC: 32.6 G/DL — SIGNIFICANT CHANGE UP (ref 32–37)
MCHC RBC-ENTMCNC: 32.6 G/DL — SIGNIFICANT CHANGE UP (ref 32–37)
MCHC RBC-ENTMCNC: 33.2 G/DL — SIGNIFICANT CHANGE UP (ref 32–37)
MCHC RBC-ENTMCNC: 33.2 G/DL — SIGNIFICANT CHANGE UP (ref 32–37)
MCV RBC AUTO: 90.8 FL — SIGNIFICANT CHANGE UP (ref 80–94)
MCV RBC AUTO: 90.8 FL — SIGNIFICANT CHANGE UP (ref 80–94)
MCV RBC AUTO: 92.1 FL — SIGNIFICANT CHANGE UP (ref 80–94)
MCV RBC AUTO: 92.1 FL — SIGNIFICANT CHANGE UP (ref 80–94)
MONOCYTES # BLD AUTO: 0.5 K/UL — SIGNIFICANT CHANGE UP (ref 0.1–0.6)
MONOCYTES # BLD AUTO: 0.5 K/UL — SIGNIFICANT CHANGE UP (ref 0.1–0.6)
MONOCYTES NFR BLD AUTO: 9.8 % — HIGH (ref 1.7–9.3)
MONOCYTES NFR BLD AUTO: 9.8 % — HIGH (ref 1.7–9.3)
NEUTROPHILS # BLD AUTO: 3.1 K/UL — SIGNIFICANT CHANGE UP (ref 1.4–6.5)
NEUTROPHILS # BLD AUTO: 3.1 K/UL — SIGNIFICANT CHANGE UP (ref 1.4–6.5)
NEUTROPHILS NFR BLD AUTO: 60.8 % — SIGNIFICANT CHANGE UP (ref 42.2–75.2)
NEUTROPHILS NFR BLD AUTO: 60.8 % — SIGNIFICANT CHANGE UP (ref 42.2–75.2)
NRBC # BLD: 0 /100 WBCS — SIGNIFICANT CHANGE UP (ref 0–0)
PLATELET # BLD AUTO: 204 K/UL — SIGNIFICANT CHANGE UP (ref 130–400)
PLATELET # BLD AUTO: 204 K/UL — SIGNIFICANT CHANGE UP (ref 130–400)
PLATELET # BLD AUTO: 209 K/UL — SIGNIFICANT CHANGE UP (ref 130–400)
PLATELET # BLD AUTO: 209 K/UL — SIGNIFICANT CHANGE UP (ref 130–400)
PMV BLD: 9.2 FL — SIGNIFICANT CHANGE UP (ref 7.4–10.4)
PMV BLD: 9.2 FL — SIGNIFICANT CHANGE UP (ref 7.4–10.4)
PMV BLD: 9.5 FL — SIGNIFICANT CHANGE UP (ref 7.4–10.4)
PMV BLD: 9.5 FL — SIGNIFICANT CHANGE UP (ref 7.4–10.4)
POTASSIUM SERPL-MCNC: 5.3 MMOL/L — HIGH (ref 3.5–5)
POTASSIUM SERPL-MCNC: 5.3 MMOL/L — HIGH (ref 3.5–5)
POTASSIUM SERPL-SCNC: 5.3 MMOL/L — HIGH (ref 3.5–5)
POTASSIUM SERPL-SCNC: 5.3 MMOL/L — HIGH (ref 3.5–5)
RBC # BLD: 4.65 M/UL — LOW (ref 4.7–6.1)
RBC # BLD: 4.65 M/UL — LOW (ref 4.7–6.1)
RBC # BLD: 4.66 M/UL — LOW (ref 4.7–6.1)
RBC # BLD: 4.66 M/UL — LOW (ref 4.7–6.1)
RBC # FLD: 13.3 % — SIGNIFICANT CHANGE UP (ref 11.5–14.5)
SODIUM SERPL-SCNC: 136 MMOL/L — SIGNIFICANT CHANGE UP (ref 135–146)
SODIUM SERPL-SCNC: 136 MMOL/L — SIGNIFICANT CHANGE UP (ref 135–146)
WBC # BLD: 5.1 K/UL — SIGNIFICANT CHANGE UP (ref 4.8–10.8)
WBC # BLD: 5.1 K/UL — SIGNIFICANT CHANGE UP (ref 4.8–10.8)
WBC # BLD: 6.18 K/UL — SIGNIFICANT CHANGE UP (ref 4.8–10.8)
WBC # BLD: 6.18 K/UL — SIGNIFICANT CHANGE UP (ref 4.8–10.8)
WBC # FLD AUTO: 5.1 K/UL — SIGNIFICANT CHANGE UP (ref 4.8–10.8)
WBC # FLD AUTO: 5.1 K/UL — SIGNIFICANT CHANGE UP (ref 4.8–10.8)
WBC # FLD AUTO: 6.18 K/UL — SIGNIFICANT CHANGE UP (ref 4.8–10.8)
WBC # FLD AUTO: 6.18 K/UL — SIGNIFICANT CHANGE UP (ref 4.8–10.8)

## 2023-11-09 PROCEDURE — 86803 HEPATITIS C AB TEST: CPT

## 2023-11-09 PROCEDURE — G0378: CPT

## 2023-11-09 PROCEDURE — 93306 TTE W/DOPPLER COMPLETE: CPT

## 2023-11-09 PROCEDURE — 99223 1ST HOSP IP/OBS HIGH 75: CPT

## 2023-11-09 PROCEDURE — 82962 GLUCOSE BLOOD TEST: CPT

## 2023-11-09 PROCEDURE — 85025 COMPLETE CBC W/AUTO DIFF WBC: CPT

## 2023-11-09 PROCEDURE — 93010 ELECTROCARDIOGRAM REPORT: CPT

## 2023-11-09 PROCEDURE — 83735 ASSAY OF MAGNESIUM: CPT

## 2023-11-09 PROCEDURE — 85027 COMPLETE CBC AUTOMATED: CPT

## 2023-11-09 PROCEDURE — 80048 BASIC METABOLIC PNL TOTAL CA: CPT

## 2023-11-09 PROCEDURE — 85730 THROMBOPLASTIN TIME PARTIAL: CPT

## 2023-11-09 PROCEDURE — 83605 ASSAY OF LACTIC ACID: CPT

## 2023-11-09 PROCEDURE — 84145 PROCALCITONIN (PCT): CPT

## 2023-11-09 PROCEDURE — 36415 COLL VENOUS BLD VENIPUNCTURE: CPT

## 2023-11-09 PROCEDURE — 99448 NTRPROF PH1/NTRNET/EHR 21-30: CPT

## 2023-11-09 RX ORDER — ALLOPURINOL 300 MG
0 TABLET ORAL
Refills: 0 | DISCHARGE

## 2023-11-09 RX ORDER — PANTOPRAZOLE SODIUM 20 MG/1
1 TABLET, DELAYED RELEASE ORAL
Refills: 0 | DISCHARGE

## 2023-11-09 RX ORDER — ATORVASTATIN CALCIUM 80 MG/1
20 TABLET, FILM COATED ORAL AT BEDTIME
Refills: 0 | Status: DISCONTINUED | OUTPATIENT
Start: 2023-11-09 | End: 2023-11-11

## 2023-11-09 RX ORDER — APIXABAN 2.5 MG/1
10 TABLET, FILM COATED ORAL EVERY 12 HOURS
Refills: 0 | Status: DISCONTINUED | OUTPATIENT
Start: 2023-11-09 | End: 2023-11-11

## 2023-11-09 RX ORDER — ALLOPURINOL 300 MG
100 TABLET ORAL DAILY
Refills: 0 | Status: DISCONTINUED | OUTPATIENT
Start: 2023-11-09 | End: 2023-11-11

## 2023-11-09 RX ORDER — PANTOPRAZOLE SODIUM 20 MG/1
40 TABLET, DELAYED RELEASE ORAL
Refills: 0 | Status: DISCONTINUED | OUTPATIENT
Start: 2023-11-09 | End: 2023-11-11

## 2023-11-09 RX ORDER — GABAPENTIN 400 MG/1
0 CAPSULE ORAL
Refills: 0 | DISCHARGE

## 2023-11-09 RX ORDER — ACETAMINOPHEN 500 MG
650 TABLET ORAL ONCE
Refills: 0 | Status: COMPLETED | OUTPATIENT
Start: 2023-11-09 | End: 2023-11-09

## 2023-11-09 RX ORDER — GABAPENTIN 400 MG/1
300 CAPSULE ORAL
Refills: 0 | Status: DISCONTINUED | OUTPATIENT
Start: 2023-11-09 | End: 2023-11-11

## 2023-11-09 RX ORDER — ATORVASTATIN CALCIUM 80 MG/1
1 TABLET, FILM COATED ORAL
Refills: 0 | DISCHARGE

## 2023-11-09 RX ORDER — METFORMIN HYDROCHLORIDE 850 MG/1
1 TABLET ORAL
Refills: 0 | DISCHARGE

## 2023-11-09 RX ORDER — ACETAMINOPHEN 500 MG
650 TABLET ORAL EVERY 8 HOURS
Refills: 0 | Status: DISCONTINUED | OUTPATIENT
Start: 2023-11-09 | End: 2023-11-10

## 2023-11-09 RX ADMIN — Medication 100 MILLIGRAM(S): at 12:11

## 2023-11-09 RX ADMIN — Medication 25 GRAM(S): at 01:20

## 2023-11-09 RX ADMIN — Medication 650 MILLIGRAM(S): at 12:11

## 2023-11-09 RX ADMIN — ATORVASTATIN CALCIUM 20 MILLIGRAM(S): 80 TABLET, FILM COATED ORAL at 21:51

## 2023-11-09 RX ADMIN — HEPARIN SODIUM 1400 UNIT(S)/HR: 5000 INJECTION INTRAVENOUS; SUBCUTANEOUS at 08:41

## 2023-11-09 RX ADMIN — HEPARIN SODIUM 0 UNIT(S)/HR: 5000 INJECTION INTRAVENOUS; SUBCUTANEOUS at 07:39

## 2023-11-09 RX ADMIN — APIXABAN 10 MILLIGRAM(S): 2.5 TABLET, FILM COATED ORAL at 14:21

## 2023-11-09 RX ADMIN — HEPARIN SODIUM 1400 UNIT(S)/HR: 5000 INJECTION INTRAVENOUS; SUBCUTANEOUS at 12:12

## 2023-11-09 RX ADMIN — GABAPENTIN 300 MILLIGRAM(S): 400 CAPSULE ORAL at 17:26

## 2023-11-09 NOTE — H&P ADULT - ASSESSMENT
68y M pmh lymphoma s/p chemoradiation in remission since 2017, DMII, gout presenting with L sided pleuritic chest pain admitted for acute provoked pulmonary emboli.     #Acute provoked PE  #Suspected LLE DVT  - pleuritic chest pain, 2 days ago   - CTAP -ve for nephrolithiasis, CTA Chest +ve acute left upper and lower lobar and segmental pulmonary emboli  - provoked - recent flight from [place]   - PESI 106 - Class IV, High Risk: 4.0-11.4% 30-day mortality in this group.  - trop -ve, BNP wnl, no evidence of RHS, on RA   - started heparin ggt, can switch to eliquis 5mg BID  - pending TTE   - vascular consulted - no intervention needed  - f/u LLE venous duplex       #DMII  #Gout   - c/w allopurinol 100 qd  - c/w gabapentin 300mg po bid  - c/w Metformin 500mg po bid   - c/w atorvastatin 20mg qhs     # To follow up  - LE duplex  - eliquis   - TTE     # Misc  - DVT Prophylaxis: heparin   Injectable  - GI Prophylaxis: pantoprazole    Tablet 40 milliGRAM(s) Oral before breakfast  - Diet: DASH  - Activity: IAT   - Code Status: Full     Selvin Ruiz  PGY2, Internal Medicine   [facility]   68y M pmh lymphoma s/p chemoradiation in remission since 2017, DMII, gout presenting with L sided pleuritic chest pain admitted for acute provoked pulmonary emboli.     #Acute provoked PE  #Suspected LLE DVT  - pleuritic chest pain, 2 days ago   - CTAP -ve for nephrolithiasis, CTA Chest +ve acute left upper and lower lobar and segmental pulmonary emboli  - provoked - recent flight from [place]   - PESI 106 - Class IV, High Risk: 4.0-11.4% 30-day mortality in this group.  - trop -ve, BNP wnl, no evidence of RHS, on RA   - started heparin ggt  - pending TTE - if no RHS can switch to eliquis 5mg BID  - vascular consulted - no intervention needed  - f/u LLE venous duplex       #DMII  #Gout   - c/w allopurinol 100 qd  - c/w gabapentin 300mg po bid  - c/w Metformin 500mg po bid   - c/w atorvastatin 20mg qhs     # To follow up  - LE duplex  - eliquis once able   - TTE     # Misc  - DVT Prophylaxis: heparin   Injectable  - GI Prophylaxis: pantoprazole    Tablet 40 milliGRAM(s) Oral before breakfast  - Diet: DASH  - Activity: IAT   - Code Status: Full     Selvin Ruiz  PGY2, Internal Medicine   [facility]   68y M pmh lymphoma s/p chemoradiation in remission since 2017, DMII, gout presenting with L sided pleuritic chest pain admitted for acute provoked pulmonary emboli.     #Acute provoked PE  #Suspected LLE DVT  - pleuritic chest pain, 2 days ago   - CTAP -ve for nephrolithiasis, CTA Chest +ve acute left upper and lower lobar and segmental pulmonary emboli  - provoked - recent flight from [place]   - PESI 106 - Class IV, High Risk: 4.0-11.4% 30-day mortality in this group.  - trop -ve, BNP wnl, no evidence of RHS, on RA   - started heparin ggt  - pending TTE - if no RHS can switch to eliquis  - vascular consulted - no intervention needed  - f/u LLE venous duplex       #DMII  #Gout   - c/w allopurinol 100 qd  - c/w gabapentin 300mg po bid  - c/w Metformin 500mg po bid   - c/w atorvastatin 20mg qhs     # To follow up  - LE duplex  - eliquis once able   - TTE     # Misc  - DVT Prophylaxis: heparin   Injectable  - GI Prophylaxis: pantoprazole    Tablet 40 milliGRAM(s) Oral before breakfast  - Diet: DASH  - Activity: IAT   - Code Status: Full     Selvin Ruiz  PGY2, Internal Medicine   [facility]   68y M pmh lymphoma s/p chemoradiation in remission since 2017, DMII, gout presenting with L sided pleuritic chest pain admitted for acute provoked pulmonary emboli.     #Acute provoked PE  #Suspected LLE DVT  - pleuritic chest pain, 2 days ago   - CTAP -ve for nephrolithiasis, CTA Chest +ve acute left upper and lower lobar and segmental pulmonary emboli  - provoked - recent flight from [place]   - PESI 106 - Class IV, High Risk: 4.0-11.4% 30-day mortality in this group.  - trop -ve, BNP wnl, no evidence of RHS, on RA   - started heparin ggt  - pending TTE - if no RHS can switch to eliquis  - vascular consulted - no intervention needed  - f/u LLE venous duplex   - creatine 1.5 - bsl ~ 1.3/1.4      #DMII  #Gout   - c/w allopurinol 100 qd  - c/w gabapentin 300mg po bid  - c/w Metformin 500mg po bid   - c/w atorvastatin 20mg qhs     # To follow up  - LE duplex  - eliquis once able   - TTE     # Misc  - DVT Prophylaxis: heparin   Injectable  - GI Prophylaxis: pantoprazole    Tablet 40 milliGRAM(s) Oral before breakfast  - Diet: DASH  - Activity: IAT   - Code Status: Full     Selvin Ruiz  PGY2, Internal Medicine   [facility]

## 2023-11-09 NOTE — H&P ADULT - ATTENDING COMMENTS
68y M pmh lymphoma s/p chemoradiation in remission since 2017, DMII, gout presenting with L sided pleuritic chest pain admitted for acute provoked pulmonary emboli.     #Acute PE/DVT  CTA Chest +ve acute left upper and lower lobar and segmental pulmonary emboli  Recent flight from [place]  No intervention warranted per vascular and IR  on RA  - Pending b/l LE duplex  - Switch to Eliquis  - Pt felt uneasy about leaving today due to pleuritic chest pain, will continue pain management  - Echo  - ADP 24h    #DMII  #Gout   - c/w allopurinol 100 qd  - c/w gabapentin 300mg po bid  - c/w Metformin 500mg po bid   - c/w atorvastatin 20mg qhs     DVT PPX, heparin/eliquis    #Progress Note Handoff  Pending (specify): Echo, pain control  Family discussion: selene pt regarding tx for DVT/PE  Disposition: Home

## 2023-11-09 NOTE — CONSULT NOTE ADULT - SUBJECTIVE AND OBJECTIVE BOX
INTERVENTIONAL RADIOLOGY CONSULT:     Procedure Requested: PE thrombectomy     HPI:  68y M pmh lymphoma s/p chemoradiation in remission since 2017, DMII, gout presenting with L sided Flank pain. Patient states the pain started 2 days ago. He described it as 7-10 L sided rib pain worsening with inspiration and movement. He also reports pain and swelling in his L foot. He has hx of gout of the L 1st toe and recently saw his PCP and prescribed him with some medications which he does not recall the name. Patient recently flew back from [place] ~1 week ago. Otherwise patient has no other complaints and denies any symptoms of chest pain, shortness of breath, fever, palpitations, dizziness, lightheadedness, n/v/c/d, melena, hematochezia, dysuria or hematuria.     Patient does not smoke, drink etoh or use recreational drugs. He had follow up with his [facility] oncologist over 1 yr ago and states he has been in remission for several years. Patient has never had hx of VTE and denies any family hx of cancer or bleeding disorders or coagulopathy.     Vitals in the ED  T 98  HR 79  /86  RR 16 SpO2 97 On RA    Significant Labs  wbc 5.9 hgb 14.6 plt 155  Na 139 K 5.9 *hemolyzed BUN/Cr 21/1.4  Mg 1.6, Lactate 3.6  Troponin -ve, BNP 50   VBG 7.28/52/31/24    CTA Chest   Acute left upper and lower lobar and segmental pulmonary emboli. No   evidence of right heart strain.     CTAP  No CT evidence of acute intra-abdominal pathology. No nephrolithiasis or   hydronephrosis bilaterally.      Patient received heparin, zofran in the ED. Admitted to medicine for management.  (09 Nov 2023 10:39)      PAST MEDICAL & SURGICAL HISTORY:  Lymphoma      Type 2 diabetes mellitus      Gout          MEDICATIONS  (STANDING):  acetaminophen     Tablet .. 650 milliGRAM(s) Oral once  allopurinol 100 milliGRAM(s) Oral daily  atorvastatin 20 milliGRAM(s) Oral at bedtime  gabapentin 300 milliGRAM(s) Oral two times a day  heparin  Infusion.  Unit(s)/Hr (17 mL/Hr) IV Continuous <Continuous>  ondansetron Injectable 4 milliGRAM(s) IV Push once  pantoprazole    Tablet 40 milliGRAM(s) Oral before breakfast    MEDICATIONS  (PRN):  heparin   Injectable 7500 Unit(s) IV Push every 6 hours PRN For aPTT less than 40  heparin   Injectable 3500 Unit(s) IV Push every 6 hours PRN For aPTT between 40 - 57      Allergies    No Known Allergies    Intolerances        Social History:   Smoking: Yes [ ]  No [ ]   ______pk yrs  ETOH  Yes [ ]  No [ ]  Social [ ]  DRUGS:  Yes [ ]  No [ ]  if so what______________    FAMILY HISTORY:      Physical Exam:   Vital Signs Last 24 Hrs  T(C): 36.4 (09 Nov 2023 07:21), Max: 36.7 (08 Nov 2023 18:15)  T(F): 97.6 (09 Nov 2023 07:21), Max: 98 (08 Nov 2023 18:15)  HR: 61 (09 Nov 2023 07:21) (61 - 79)  BP: 107/60 (09 Nov 2023 07:21) (107/60 - 139/86)  BP(mean): --  RR: 18 (09 Nov 2023 07:21) (16 - 18)  SpO2: 100% (09 Nov 2023 07:21) (97% - 100%)    Parameters below as of 09 Nov 2023 07:21  Patient On (Oxygen Delivery Method): room air        Labs:                         14.0   6.18  )-----------( 204      ( 09 Nov 2023 04:55 )             42.9     11-08    139  |  103  |  21<H>  ----------------------------<  129<H>  5.9<H>   |  20  |  1.4    Ca    9.8      08 Nov 2023 19:26  Mg     1.6     11-08    TPro  7.5  /  Alb  4.5  /  TBili  0.6  /  DBili  x   /  AST  32  /  ALT  18  /  AlkPhos  72  11-08    PT/INR - ( 08 Nov 2023 21:46 )   PT: 11.70 sec;   INR: 1.03 ratio         PTT - ( 09 Nov 2023 09:48 )  PTT:70.3 sec    Pertinent labs:                      14.0   6.18  )-----------( 204      ( 09 Nov 2023 04:55 )             42.9       11-08    139  |  103  |  21<H>  ----------------------------<  129<H>  5.9<H>   |  20  |  1.4    Ca    9.8      08 Nov 2023 19:26  Mg     1.6     11-08    TPro  7.5  /  Alb  4.5  /  TBili  0.6  /  DBili  x   /  AST  32  /  ALT  18  /  AlkPhos  72  11-08      PT/INR - ( 08 Nov 2023 21:46 )   PT: 11.70 sec;   INR: 1.03 ratio         PTT - ( 09 Nov 2023 09:48 )  PTT:70.3 sec    Radiology & Additional Studies:     IMPRESSION:  Acute left upper and lower lobar and segmental pulmonary emboli. No   evidence of right heart strain.    --- End of Report ---    Radiology imaging reviewed.       ASSESSMENT/ PLAN:   68y M pmh lymphoma s/p chemoradiation in remission since 2017, DMII, gout presenting with L sided Flank pain. Patient states the pain started 2 days ago. He described it as 7-10 L sided rib pain worsening with inspiration and movement. He also reports pain and swelling in his L foot. He has hx of gout of the L 1st toe and recently saw his PCP and prescribed him with some medications which he does not recall the name. Patient recently flew back from [place] ~1 week ago. On admission found to have acute left upper and lower lobar and segmental pulmonary emboli with no evidence of right heart strain. Vascular consulted, recommended IR consult for left lower lobe pulmonary embolism.  - patient HD stable on RA, troponin and BNP negative  - imaging shows no right heart strain  - currently on heparin drip  - patient considered low risk PE, thrombectomy not warranted at this time  - will follow up ECHO results to confirm no heart strain      Thank you for the courtesy of this consult, please call [phone] with any further questions.

## 2023-11-09 NOTE — H&P ADULT - NSHPPHYSICALEXAM_GEN_ALL_CORE
PHYSICAL EXAM:  GEN: NAD, Resting comfortably in bed, cooperative  PULM: Clear to auscultation bilaterally, No wheezing, rales, rhonchi, no respiratory distress, room air   CVS: Regular rate and rhythm, S1-S2, no murmurs, heaves, thrills  ABD: Soft, non-tender, non-distended, no guarding, BS +  EXT: LLE edema up to shin, No cyanosis, extremities warm/dry, peripheral pulses palpable, L 1st toe podagra, non-tender, no erythema   NEURO: A&Ox3, no focal deficits, following commands, answers appropriately

## 2023-11-09 NOTE — CONSULT NOTE ADULT - CONSULT REASON
Incidental finding of left lower lobe pulmonary embolism. Suspected DVT in the setting of left lower extremity swelling since 2 months.
PE

## 2023-11-09 NOTE — H&P ADULT - HISTORY OF PRESENT ILLNESS
68y M pmh lymphoma s/p chemoradiation in remission since 2017, DMII, gout presenting with L sided Flank pain. Patient states the pain started 2 days ago. He described it as 7-10 L sided rib pain worsening with inspiration and movement. He also reports pain and swelling in his L foot. He has hx of gout of the L 1st toe and recently saw his PCP and prescribed him with some medications which he does not recall the name. Patient recently flew back from [place] ~1 week ago. Otherwise patient has no other complaints and denies any symptoms of chest pain, shortness of breath, fever, palpitations, dizziness, lightheadedness, n/v/c/d, melena, hematochezia, dysuria or hematuria.     Patient does not smoke, drink etoh or use recreational drugs. He had follow up with his [place] oncologist over 1 yr ago and states he has been in remission for several years. Patient has never had hx of VTE and denies any family hx of cancer or bleeding disorders or coagulopathy.     Vitals in the ED  T 98  HR 79  /86  RR 16 SpO2 97 On RA    Significant Labs  wbc 5.9 hgb 14.6 plt 155  Na 139 K 5.9 *hemolyzed BUN/Cr 21/1.4  Mg 1.6, Lactate 3.6  Troponin -ve, BNP 50   VBG 7.28/52/31/24    CTA Chest   Acute left upper and lower lobar and segmental pulmonary emboli. No   evidence of right heart strain.     CTAP  No CT evidence of acute intra-abdominal pathology. No nephrolithiasis or   hydronephrosis bilaterally.      Patient received heparin, zofran in the ED. Admitted to medicine for management.

## 2023-11-09 NOTE — PATIENT PROFILE ADULT - HEALTH LITERACY
No new care gaps identified.  Powered by [employer]. Reference number: [id number].   4/06/2022 12:12:43 AM CDT   no

## 2023-11-09 NOTE — H&P ADULT - NSHPLABSRESULTS_GEN_ALL_CORE
14.0   6.18  )-----------( 204      ( 09 Nov 2023 04:55 )             42.9       11-08    139  |  103  |  21<H>  ----------------------------<  129<H>  5.9<H>   |  20  |  1.4    Ca    9.8      08 Nov 2023 19:26  Mg     1.6     11-08    TPro  7.5  /  Alb  4.5  /  TBili  0.6  /  DBili  x   /  AST  32  /  ALT  18  /  AlkPhos  72  11-08              Urinalysis Basic - ( 08 Nov 2023 19:26 )    Color: x / Appearance: x / SG: x / pH: x  Gluc: 129 mg/dL / Ketone: x  / Bili: x / Urobili: x   Blood: x / Protein: x / Nitrite: x   Leuk Esterase: x / RBC: x / WBC x   Sq Epi: x / Non Sq Epi: x / Bacteria: x        PT/INR - ( 08 Nov 2023 21:46 )   PT: 11.70 sec;   INR: 1.03 ratio         PTT - ( 09 Nov 2023 04:55 )  PTT:142.7 sec    Lactate Trend  11-08 @ 19:26 Lactate:3.6       CARDIAC MARKERS ( 08 Nov 2023 21:46 )  x     / <0.01 ng/mL / x     / x     / x            CAPILLARY BLOOD GLUCOSE

## 2023-11-09 NOTE — PATIENT PROFILE ADULT - FALL HARM RISK - UNIVERSAL INTERVENTIONS
Bed in lowest position, wheels locked, appropriate side rails in place/Call bell, personal items and telephone in reach/Instruct patient to call for assistance before getting out of bed or chair/Non-slip footwear when patient is out of bed/[place] to call system/Physically safe environment - no spills, clutter or unnecessary equipment/Purposeful Proactive Rounding/Room/bathroom lighting operational, light cord in reach

## 2023-11-10 LAB
ANION GAP SERPL CALC-SCNC: 10 MMOL/L — SIGNIFICANT CHANGE UP (ref 7–14)
ANION GAP SERPL CALC-SCNC: 10 MMOL/L — SIGNIFICANT CHANGE UP (ref 7–14)
BUN SERPL-MCNC: 19 MG/DL — SIGNIFICANT CHANGE UP (ref 10–20)
BUN SERPL-MCNC: 19 MG/DL — SIGNIFICANT CHANGE UP (ref 10–20)
CALCIUM SERPL-MCNC: 9.3 MG/DL — SIGNIFICANT CHANGE UP (ref 8.4–10.5)
CALCIUM SERPL-MCNC: 9.3 MG/DL — SIGNIFICANT CHANGE UP (ref 8.4–10.5)
CHLORIDE SERPL-SCNC: 105 MMOL/L — SIGNIFICANT CHANGE UP (ref 98–110)
CHLORIDE SERPL-SCNC: 105 MMOL/L — SIGNIFICANT CHANGE UP (ref 98–110)
CO2 SERPL-SCNC: 25 MMOL/L — SIGNIFICANT CHANGE UP (ref 17–32)
CO2 SERPL-SCNC: 25 MMOL/L — SIGNIFICANT CHANGE UP (ref 17–32)
CREAT SERPL-MCNC: 1.3 MG/DL — SIGNIFICANT CHANGE UP (ref 0.7–1.5)
CREAT SERPL-MCNC: 1.3 MG/DL — SIGNIFICANT CHANGE UP (ref 0.7–1.5)
EGFR: 61 ML/MIN/1.73M2 — SIGNIFICANT CHANGE UP
EGFR: 61 ML/MIN/1.73M2 — SIGNIFICANT CHANGE UP
GLUCOSE SERPL-MCNC: 115 MG/DL — HIGH (ref 70–99)
GLUCOSE SERPL-MCNC: 115 MG/DL — HIGH (ref 70–99)
HCT VFR BLD CALC: 45.6 % — SIGNIFICANT CHANGE UP (ref 42–52)
HCT VFR BLD CALC: 45.6 % — SIGNIFICANT CHANGE UP (ref 42–52)
HCV AB S/CO SERPL IA: 0.04 COI — SIGNIFICANT CHANGE UP
HCV AB S/CO SERPL IA: 0.04 COI — SIGNIFICANT CHANGE UP
HCV AB SERPL-IMP: SIGNIFICANT CHANGE UP
HCV AB SERPL-IMP: SIGNIFICANT CHANGE UP
HGB BLD-MCNC: 14.7 G/DL — SIGNIFICANT CHANGE UP (ref 14–18)
HGB BLD-MCNC: 14.7 G/DL — SIGNIFICANT CHANGE UP (ref 14–18)
MAGNESIUM SERPL-MCNC: 2.1 MG/DL — SIGNIFICANT CHANGE UP (ref 1.8–2.4)
MAGNESIUM SERPL-MCNC: 2.1 MG/DL — SIGNIFICANT CHANGE UP (ref 1.8–2.4)
MCHC RBC-ENTMCNC: 29.3 PG — SIGNIFICANT CHANGE UP (ref 27–31)
MCHC RBC-ENTMCNC: 29.3 PG — SIGNIFICANT CHANGE UP (ref 27–31)
MCHC RBC-ENTMCNC: 32.2 G/DL — SIGNIFICANT CHANGE UP (ref 32–37)
MCHC RBC-ENTMCNC: 32.2 G/DL — SIGNIFICANT CHANGE UP (ref 32–37)
MCV RBC AUTO: 91 FL — SIGNIFICANT CHANGE UP (ref 80–94)
MCV RBC AUTO: 91 FL — SIGNIFICANT CHANGE UP (ref 80–94)
NRBC # BLD: 0 /100 WBCS — SIGNIFICANT CHANGE UP (ref 0–0)
NRBC # BLD: 0 /100 WBCS — SIGNIFICANT CHANGE UP (ref 0–0)
PLATELET # BLD AUTO: 243 K/UL — SIGNIFICANT CHANGE UP (ref 130–400)
PLATELET # BLD AUTO: 243 K/UL — SIGNIFICANT CHANGE UP (ref 130–400)
PMV BLD: 9.1 FL — SIGNIFICANT CHANGE UP (ref 7.4–10.4)
PMV BLD: 9.1 FL — SIGNIFICANT CHANGE UP (ref 7.4–10.4)
POTASSIUM SERPL-MCNC: 4.5 MMOL/L — SIGNIFICANT CHANGE UP (ref 3.5–5)
POTASSIUM SERPL-MCNC: 4.5 MMOL/L — SIGNIFICANT CHANGE UP (ref 3.5–5)
POTASSIUM SERPL-SCNC: 4.5 MMOL/L — SIGNIFICANT CHANGE UP (ref 3.5–5)
POTASSIUM SERPL-SCNC: 4.5 MMOL/L — SIGNIFICANT CHANGE UP (ref 3.5–5)
PROCALCITONIN SERPL-MCNC: 0.09 NG/ML — SIGNIFICANT CHANGE UP (ref 0.02–0.1)
PROCALCITONIN SERPL-MCNC: 0.09 NG/ML — SIGNIFICANT CHANGE UP (ref 0.02–0.1)
RBC # BLD: 5.01 M/UL — SIGNIFICANT CHANGE UP (ref 4.7–6.1)
RBC # BLD: 5.01 M/UL — SIGNIFICANT CHANGE UP (ref 4.7–6.1)
RBC # FLD: 13.3 % — SIGNIFICANT CHANGE UP (ref 11.5–14.5)
RBC # FLD: 13.3 % — SIGNIFICANT CHANGE UP (ref 11.5–14.5)
SODIUM SERPL-SCNC: 140 MMOL/L — SIGNIFICANT CHANGE UP (ref 135–146)
SODIUM SERPL-SCNC: 140 MMOL/L — SIGNIFICANT CHANGE UP (ref 135–146)
WBC # BLD: 3.6 K/UL — LOW (ref 4.8–10.8)
WBC # BLD: 3.6 K/UL — LOW (ref 4.8–10.8)
WBC # FLD AUTO: 3.6 K/UL — LOW (ref 4.8–10.8)
WBC # FLD AUTO: 3.6 K/UL — LOW (ref 4.8–10.8)

## 2023-11-10 PROCEDURE — 93306 TTE W/DOPPLER COMPLETE: CPT | Mod: 26

## 2023-11-10 PROCEDURE — 99232 SBSQ HOSP IP/OBS MODERATE 35: CPT

## 2023-11-10 RX ORDER — MORPHINE SULFATE 50 MG/1
2 CAPSULE, EXTENDED RELEASE ORAL ONCE
Refills: 0 | Status: DISCONTINUED | OUTPATIENT
Start: 2023-11-10 | End: 2023-11-10

## 2023-11-10 RX ORDER — APIXABAN 2.5 MG/1
1 TABLET, FILM COATED ORAL
Qty: 74 | Refills: 0
Start: 2023-11-10

## 2023-11-10 RX ORDER — SODIUM CHLORIDE 9 MG/ML
1000 INJECTION, SOLUTION INTRAVENOUS
Refills: 0 | Status: DISCONTINUED | OUTPATIENT
Start: 2023-11-10 | End: 2023-11-11

## 2023-11-10 RX ORDER — APIXABAN 2.5 MG/1
2 TABLET, FILM COATED ORAL
Qty: 84 | Refills: 0
Start: 2023-11-10

## 2023-11-10 RX ADMIN — ATORVASTATIN CALCIUM 20 MILLIGRAM(S): 80 TABLET, FILM COATED ORAL at 21:20

## 2023-11-10 RX ADMIN — PANTOPRAZOLE SODIUM 40 MILLIGRAM(S): 20 TABLET, DELAYED RELEASE ORAL at 05:41

## 2023-11-10 RX ADMIN — SODIUM CHLORIDE 50 MILLILITER(S): 9 INJECTION, SOLUTION INTRAVENOUS at 14:10

## 2023-11-10 RX ADMIN — MORPHINE SULFATE 2 MILLIGRAM(S): 50 CAPSULE, EXTENDED RELEASE ORAL at 08:03

## 2023-11-10 RX ADMIN — GABAPENTIN 300 MILLIGRAM(S): 400 CAPSULE ORAL at 17:20

## 2023-11-10 RX ADMIN — Medication 100 MILLIGRAM(S): at 12:14

## 2023-11-10 RX ADMIN — APIXABAN 10 MILLIGRAM(S): 2.5 TABLET, FILM COATED ORAL at 00:04

## 2023-11-10 RX ADMIN — APIXABAN 10 MILLIGRAM(S): 2.5 TABLET, FILM COATED ORAL at 12:15

## 2023-11-10 RX ADMIN — GABAPENTIN 300 MILLIGRAM(S): 400 CAPSULE ORAL at 05:41

## 2023-11-10 NOTE — PROGRESS NOTE ADULT - SUBJECTIVE AND OBJECTIVE BOX
24H events:    Patient is a 68y old Male who presents with a chief complaint of Flank Pain (09 Nov 2023 12:08)    Primary diagnosis of Pulmonary embolism    Today is hospital day 1d. This morning patient was seen and examined at bedside, resting comfortably in bed.    No acute or major events overnight.    Code Status:    Family communication:  Contact date:  Name of person contacted:  Relationship to patient:  Communication details:  What matters most:    PAST MEDICAL & SURGICAL HISTORY  Lymphoma    Type 2 diabetes mellitus    Gout      SOCIAL HISTORY:  Social History:      ALLERGIES:  No Known Allergies    MEDICATIONS:  STANDING MEDICATIONS  allopurinol 100 milliGRAM(s) Oral daily  apixaban 10 milliGRAM(s) Oral every 12 hours  atorvastatin 20 milliGRAM(s) Oral at bedtime  gabapentin 300 milliGRAM(s) Oral two times a day  ondansetron Injectable 4 milliGRAM(s) IV Push once  pantoprazole    Tablet 40 milliGRAM(s) Oral before breakfast    PRN MEDICATIONS  acetaminophen     Tablet .. 650 milliGRAM(s) Oral every 8 hours PRN    VITALS:   T(F): 98.1  HR: 62  BP: 125/59  RR: 18  SpO2: 96%    PHYSICAL EXAM:  GENERAL: AAOx3, NAD, lying in bed comfortably  HEENT:  Atraumatic, Normocephalic, MMM, EOMI  CHEST/LUNG: Unlabored respirations, b/l air entry, left sided pleuritic chest pain on deep inspiration, no SOB   HEART: Regular rate and rhythm, normal s1/s2  ABDOMEN: Soft, nontender, nondistended  EXTREMITIES: Left lower leg swelling and erythema, limited range of motion due to pain, normal right leg  SKIN: No rashes or lesions to b/l forearm, face.     AMPAC score:    ( - ) Indwelling Vasquez Catheter:   Date insterted:    Reason (  ) Critical illness     (  ) urinary retention    (  ) Accurate Ins/Outs Monitoring     (  ) CMO patient    ( - ) Central Line:   Date inserted:  Location: (  ) Right IJ     (  ) Left IJ     (  ) Right Fem     (  ) Left Fem    ( - ) SPC        ( - ) pigtail       ( - ) PEG tube       ( - ) colostomy       ( - ) jejunostomy  ( - ) U-Dall    LABS:                        14.0   5.10  )-----------( 209      ( 09 Nov 2023 11:33 )             42.2     11-09    136  |  105  |  22<H>  ----------------------------<  96  5.3<H>   |  21  |  1.3    Ca    9.2      09 Nov 2023 11:33  Mg     2.2     11-09    TPro  7.5  /  Alb  4.5  /  TBili  0.6  /  DBili  x   /  AST  32  /  ALT  18  /  AlkPhos  72  11-08    PT/INR - ( 08 Nov 2023 21:46 )   PT: 11.70 sec;   INR: 1.03 ratio         PTT - ( 09 Nov 2023 18:08 )  PTT:31.9 sec  Urinalysis Basic - ( 09 Nov 2023 11:33 )    Color: x / Appearance: x / SG: x / pH: x  Gluc: 96 mg/dL / Ketone: x  / Bili: x / Urobili: x   Blood: x / Protein: x / Nitrite: x   Leuk Esterase: x / RBC: x / WBC x   Sq Epi: x / Non Sq Epi: x / Bacteria: x        Lactate, Blood: 1.3 mmol/L (11-09-23 @ 11:33)      CARDIAC MARKERS ( 08 Nov 2023 21:46 )  x     / <0.01 ng/mL / x     / x     / x          RADIOLOGY:

## 2023-11-10 NOTE — PROGRESS NOTE ADULT - ASSESSMENT
68y M pmh lymphoma s/p chemoradiation in remission since 2017, DMII, gout presenting with L sided pleuritic chest pain admitted for acute provoked pulmonary emboli.     #Acute provoked PE  # with  LLE extensive  DVT  - pleuritic chest pain, 2 days ago   -- recent flight from [place]  - CTAP -ve for nephrolithiasis, CTA Chest +ve acute left upper and lower lobar and segmental pulmonary emboli   - PESI 106 - Class IV, High Risk: 4.0-11.4% 30-day mortality in this group.  - trop -ve, BNP wnl, no evidence of RHS, on RA   - s/p heparin ggt >> now on  Eliquis 10mg q12h  - LLE Venous Duplex: DVT seen in left femoral, popliteal, gastrocnemius, posterior tibial, peroneal, and anterior tibial veins.  - vascular consulted - no intervention needed  -  TTE  >> noted, no Pulm HTN or right heart strain   - creatine 1.5 >> 1.3 today  - bsl ~ 1.3/1.4  - pain control     #DMII  #Gout   - c/w allopurinol 100 qd  - c/w gabapentin 300mg po bid  - c/w Metformin 500mg po bid   - c/w atorvastatin 20mg qhs     # Misc  - DVT Prophylaxis: heparin Injectable  - GI Prophylaxis: pantoprazole    Tablet 40 milliGRAM(s) Oral before breakfast  - Diet: DASH  - Activity: IAT   - Code Status: Ful    DC planning in 24 hrs with better pain control

## 2023-11-10 NOTE — PROGRESS NOTE ADULT - SUBJECTIVE AND OBJECTIVE BOX
JASON CHACON  68y  Male      Patient is a 68y old  Male who presents with a chief complaint of Flank Pain      INTERVAL HPI/OVERNIGHT EVENTS:      ******************************* REVIEW OF SYSTEMS:**********************************************    All other review of systems negative    *********************** VITALS ******************************************    T(F): 96.8 (11-10-23 @ 13:43)  HR: 67 (11-10-23 @ 13:43) (62 - 67)  BP: 132/76 (11-10-23 @ 13:43) (125/59 - 147/73)  RR: 18 (11-10-23 @ 13:43) (18 - 20)  SpO2: 98% (11-10-23 @ 13:43) (96% - 99%)            ******************************** PHYSICAL EXAM:**************************************************  GENERAL: NAD    PSYCH: no agitation, baseline mentation  HEENT:     NERVOUS SYSTEM:  Alert & Oriented X3, MS  5/5 B/L  UE and LE ; Sensory intact    PULMONARY: NICHO, CTA    CARDIOVASCULAR: S1S2 RRR    GI: Soft, NT, ND; BS present.    EXTREMITIES:  2+ Peripheral Pulses, No clubbing, cyanosis, or edema    LYMPH: No lymphadenopathy noted    SKIN: No rashes or lesions      **************************** LABS *******************************************************                          14.7   3.60  )-----------( 243      ( 10 Nov 2023 08:55 )             45.6     11-10    140  |  105  |  19  ----------------------------<  115<H>  4.5   |  25  |  1.3    Ca    9.3      10 Nov 2023 08:55  Mg     2.1     11-10    TPro  7.5  /  Alb  4.5  /  TBili  0.6  /  DBili  x   /  AST  32  /  ALT  18  /  AlkPhos  72  11-08      Urinalysis Basic - ( 10 Nov 2023 08:55 )    Color: x / Appearance: x / SG: x / pH: x  Gluc: 115 mg/dL / Ketone: x  / Bili: x / Urobili: x   Blood: x / Protein: x / Nitrite: x   Leuk Esterase: x / RBC: x / WBC x   Sq Epi: x / Non Sq Epi: x / Bacteria: x      PT/INR - ( 08 Nov 2023 21:46 )   PT: 11.70 sec;   INR: 1.03 ratio         PTT - ( 09 Nov 2023 18:08 )  PTT:31.9 sec  Lactate Trend  11-09 @ 11:33 Lactate:1.3   11-08 @ 19:26 Lactate:3.6     CARDIAC MARKERS ( 08 Nov 2023 21:46 )  x     / <0.01 ng/mL / x     / x     / x          CAPILLARY BLOOD GLUCOSE      POCT Blood Glucose.: 89 mg/dL (09 Nov 2023 14:32)          **************************Active Medications *******************************************  No Known Allergies      allopurinol 100 milliGRAM(s) Oral daily  apixaban 10 milliGRAM(s) Oral every 12 hours  atorvastatin 20 milliGRAM(s) Oral at bedtime  gabapentin 300 milliGRAM(s) Oral two times a day  lactated ringers. 1000 milliLiter(s) IV Continuous <Continuous>  ondansetron Injectable 4 milliGRAM(s) IV Push once  oxycodone    5 mG/acetaminophen 325 mG 1 Tablet(s) Oral every 8 hours PRN  pantoprazole    Tablet 40 milliGRAM(s) Oral before breakfast      ***************************************************  RADIOLOGY & ADDITIONAL TESTS:    Imaging Personally Reviewed:  [ ] YES  [ ] NO    HEALTH ISSUES - PROBLEM Dx:  Pulmonary thromboembolism    Suspected deep vein thrombosis (DVT)

## 2023-11-10 NOTE — PROGRESS NOTE ADULT - ASSESSMENT
68y M pmh lymphoma s/p chemoradiation in remission since 2017, DMII, gout presenting with L sided pleuritic chest pain admitted for acute provoked pulmonary emboli.     #Acute provoked PE  #Suspected LLE DVT  - pleuritic chest pain, 2 days ago   - provoked - recent flight from [place]  - CTAP -ve for nephrolithiasis, CTA Chest +ve acute left upper and lower lobar and segmental pulmonary emboli   - PESI 106 - Class IV, High Risk: 4.0-11.4% 30-day mortality in this group.  - trop -ve, BNP wnl, no evidence of RHS, on RA   - started heparin ggt  - Eliquis 10mg q12h  - vascular consulted - no intervention needed  - pending TTE   - f/u LLE venous duplex   - creatine 1.5 - bsl ~ 1.3/1.4    #DMII  #Gout   - c/w allopurinol 100 qd  - c/w gabapentin 300mg po bid  - c/w Metformin 500mg po bid   - c/w atorvastatin 20mg qhs     # Misc  - DVT Prophylaxis: heparin Injectable  - GI Prophylaxis: pantoprazole    Tablet 40 milliGRAM(s) Oral before breakfast  - Diet: DASH  - Activity: IAT   - Code Status: Ful 68y M pmh lymphoma s/p chemoradiation in remission since 2017, DMII, gout presenting with L sided pleuritic chest pain admitted for acute provoked pulmonary emboli.     #Acute provoked PE  #Suspected LLE DVT  - pleuritic chest pain, 2 days ago   - provoked - recent flight from [place]  - CTAP -ve for nephrolithiasis, CTA Chest +ve acute left upper and lower lobar and segmental pulmonary emboli   - PESI 106 - Class IV, High Risk: 4.0-11.4% 30-day mortality in this group.  - trop -ve, BNP wnl, no evidence of RHS, on RA   - started heparin ggt  - Eliquis 10mg q12h  - LLE Venous Duplex: DVT seen in left femoral, popliteal, gastrocnemius, posterior tibial, peroneal, and anterior tibial veins.  - vascular consulted - no intervention needed  - pending TTE   - f/u LLE venous duplex   - creatine 1.5 - bsl ~ 1.3/1.4  - pain management    #DMII  #Gout   - c/w allopurinol 100 qd  - c/w gabapentin 300mg po bid  - c/w Metformin 500mg po bid   - c/w atorvastatin 20mg qhs     # Misc  - DVT Prophylaxis: heparin Injectable  - GI Prophylaxis: pantoprazole    Tablet 40 milliGRAM(s) Oral before breakfast  - Diet: DASH  - Activity: IAT   - Code Status: Ful 68y M pmh lymphoma s/p chemoradiation in remission since 2017, DMII, gout presenting with L sided pleuritic chest pain admitted for acute provoked pulmonary emboli.     #Acute provoked PE  #Suspected LLE DVT  - pleuritic chest pain, 2 days ago   - provoked - recent flight from [place]  - CTAP -ve for nephrolithiasis, CTA Chest +ve acute left upper and lower lobar and segmental pulmonary emboli   - PESI 106 - Class IV, High Risk: 4.0-11.4% 30-day mortality in this group.  - trop -ve, BNP wnl, no evidence of RHS, on RA   - started heparin ggt  - Eliquis 10mg q12h  - LLE Venous Duplex: DVT seen in left femoral, popliteal, gastrocnemius, posterior tibial, peroneal, and anterior tibial veins.  - vascular consulted - no intervention needed  - pending TTE   - creatine 1.5 - bsl ~ 1.3/1.4  - pain management    #DMII  #Gout   - c/w allopurinol 100 qd  - c/w gabapentin 300mg po bid  - c/w Metformin 500mg po bid   - c/w atorvastatin 20mg qhs     # Misc  - DVT Prophylaxis: heparin Injectable  - GI Prophylaxis: pantoprazole    Tablet 40 milliGRAM(s) Oral before breakfast  - Diet: DASH  - Activity: IAT   - Code Status: Ful

## 2023-11-11 ENCOUNTER — TRANSCRIPTION ENCOUNTER (OUTPATIENT)
Age: 68
End: 2023-11-11

## 2023-11-11 VITALS
HEART RATE: 57 BPM | DIASTOLIC BLOOD PRESSURE: 74 MMHG | SYSTOLIC BLOOD PRESSURE: 159 MMHG | TEMPERATURE: 98 F | RESPIRATION RATE: 18 BRPM | OXYGEN SATURATION: 99 %

## 2023-11-11 PROCEDURE — 99238 HOSP IP/OBS DSCHRG MGMT 30/<: CPT

## 2023-11-11 RX ORDER — APIXABAN 2.5 MG/1
2 TABLET, FILM COATED ORAL
Qty: 84 | Refills: 0
Start: 2023-11-11

## 2023-11-11 RX ADMIN — GABAPENTIN 300 MILLIGRAM(S): 400 CAPSULE ORAL at 05:16

## 2023-11-11 RX ADMIN — PANTOPRAZOLE SODIUM 40 MILLIGRAM(S): 20 TABLET, DELAYED RELEASE ORAL at 05:16

## 2023-11-11 RX ADMIN — APIXABAN 10 MILLIGRAM(S): 2.5 TABLET, FILM COATED ORAL at 05:02

## 2023-11-11 RX ADMIN — Medication 100 MILLIGRAM(S): at 11:28

## 2023-11-11 NOTE — DISCHARGE NOTE PROVIDER - NSDCCPCAREPLAN_GEN_ALL_CORE_FT
PRINCIPAL DISCHARGE DIAGNOSIS  Diagnosis: Pulmonary embolism  Assessment and Plan of Treatment: During this hospitalization, you were diagnosed with a pulmonary embolsim. In your case, you have a  deep vein thrombosis (DVT) which is a blood clot in a large vein deep in a leg, arm, or elsewhere in the body. The clot can separate from the vein, travel to the lungs and cut off blood flow. This is a pulmonary embolism (PE). Pulmonary embolism is very serious. Both the prevention and the treatment are similar for DVT and PE.   To help prevent more blood clots from forming, please see your primary care doctor within one week of discharge, and please take your medicines exactly as instructed. Don’t skip doses. You have been prescribed a medication to thin the blood, so that more clots do not form.  Have all lab tests as recommended. This is very important when you take medicines to prevent blood clots. Make sure you stay active and walk for at least 30 minutes every day. When sitting for long periods of time, move your knees, ankles, feet, and toes.    If you smoke, get help to quit. Stay at a healthy weight. Try to exercise at least 30 minutes on most days. Before starting an exercise program, talk with your primary care provider. When traveling by car, make frequent stops to get up and move around. On long airplane rides, get up and move around when possible. If you can’t get up, wiggle your toes, move your ankles and tighten your calves to keep your blood moving.  Seek immediate medical care if you have pain, swelling, and redness in your leg, arm, or other body area. These symptoms may mean another blood clot. Also call your healthcare provider if you have signs and symptoms of bleeding, like blood in your urine, bleeding with bowel movements, or bleeding from the nose, gums, a cut, or vagina. Call 911 if you have symptoms of a blood clot in the lungs including: Chest pain, trouble breathing, coughing blood, fast heartbeat, heavy or uncontrolled bleeding.        SECONDARY DISCHARGE DIAGNOSES  Diagnosis: DVT, lower extremity  Assessment and Plan of Treatment:

## 2023-11-11 NOTE — DISCHARGE NOTE PROVIDER - DISCHARGE SERVICE FOR PATIENT
Will increase riasperdal if BPs stable   on the discharge service for the patient. I have reviewed and made amendments to the documentation where necessary.

## 2023-11-11 NOTE — DISCHARGE NOTE PROVIDER - CARE PROVIDER_API CALL
Daryl Winn  Internal Medicine  [address] [place] [address]  Phone: [phone]  Fax: [phone]  Follow Up Time: 1 week

## 2023-11-11 NOTE — DISCHARGE NOTE PROVIDER - NSDCMRMEDTOKEN_GEN_ALL_CORE_FT
allopurinol 100 mg oral tablet: orally once a day  Eliquis Starter Pack for Treatment of DVT and PE 5 mg oral tablet: 2 tab(s) orally 2 times a day Two tablets twice a day for 1 week  Then 1 tablet twice a day for 4 weeks  gabapentin 300 mg oral tablet: orally 2 times a day  Lipitor 20 mg oral tablet: 1 tab(s) orally once a day (at bedtime)  MetFORMIN (Eqv-Fortamet) 500 mg oral tablet, extended release: 1 tab(s) orally 2 times a day  pantoprazole 40 mg oral delayed release tablet: 1 tab(s) orally once a day   allopurinol 100 mg oral tablet: orally once a day  Eliquis Starter Pack for Treatment of DVT and PE 5 mg oral tablet: 2 tab(s) orally 2 times a day Two tablets twice a day until 11/15  Then 1 tablet twice a day for 1 month  gabapentin 300 mg oral tablet: orally 2 times a day  Lipitor 20 mg oral tablet: 1 tab(s) orally once a day (at bedtime)  MetFORMIN (Eqv-Fortamet) 500 mg oral tablet, extended release: 1 tab(s) orally 2 times a day  pantoprazole 40 mg oral delayed release tablet: 1 tab(s) orally once a day

## 2023-11-11 NOTE — DISCHARGE NOTE PROVIDER - HOSPITAL COURSE
68y M pmh lymphoma s/p chemoradiation in remission since 2017, DMII, gout presenting with L sided Flank pain. Patient states the pain started 2 days ago. He described it as 7-10 L sided rib pain worsening with inspiration and movement. He also reports pain and swelling in his L foot. He has hx of gout of the L 1st toe and recently saw his PCP and prescribed him with some medications which he does not recall the name. Patient recently flew back from [place] ~1 week ago. Otherwise patient has no other complaints and denies any symptoms of chest pain, shortness of breath, fever, palpitations, dizziness, lightheadedness, n/v/c/d, melena, hematochezia, dysuria or hematuria.     Patient does not smoke, drink etoh or use recreational drugs. He had follow up with his [facility] oncologist over 1 yr ago and states he has been in remission for several years. Patient has never had hx of VTE and denies any family hx of cancer or bleeding disorders or coagulopathy.     Vitals in the ED  T 98  HR 79  /86  RR 16 SpO2 97 On RA    Significant Labs  wbc 5.9 hgb 14.6 plt 155  Na 139 K 5.9 *hemolyzed BUN/Cr 21/1.4  Mg 1.6, Lactate 3.6  Troponin -ve, BNP 50   VBG 7.28/52/31/24    CTA Chest   Acute left upper and lower lobar and segmental pulmonary emboli. No   evidence of right heart strain.     CTAP  No CT evidence of acute intra-abdominal pathology. No nephrolithiasis or   hydronephrosis bilaterally.    Patient received heparin, zofran in the ED. Admitted to medicine for management.   He was transitioned to eliquis for treatment of the PE. Echo showed normal left ventricular size and wall thicknesses, with normal systolic and diastolic function. LV Ejection Fraction by Alan's Method with a biplane EF of 64 %.    Patient is medically stable and ready for discharge.   68y M pmh lymphoma s/p chemoradiation in remission since 2017, DMII, gout presenting with L sided Flank pain. Patient states the pain started 2 days ago. He described it as 7-10 L sided rib pain worsening with inspiration and movement. He also reports pain and swelling in his L foot. He has hx of gout of the L 1st toe and recently saw his PCP and prescribed him with some medications which he does not recall the name. Patient recently flew back from [place] ~1 week ago. Otherwise patient has no other complaints and denies any symptoms of chest pain, shortness of breath, fever, palpitations, dizziness, lightheadedness, n/v/c/d, melena, hematochezia, dysuria or hematuria.     Patient does not smoke, drink etoh or use recreational drugs. He had follow up with his [facility] oncologist over 1 yr ago and states he has been in remission for several years. Patient has never had hx of VTE and denies any family hx of cancer or bleeding disorders or coagulopathy.     Vitals in the ED  T 98  HR 79  /86  RR 16 SpO2 97 On RA    Significant Labs  wbc 5.9 hgb 14.6 plt 155  Na 139 K 5.9 *hemolyzed BUN/Cr 21/1.4  Mg 1.6, Lactate 3.6  Troponin -ve, BNP 50   VBG 7.28/52/31/24    CTA Chest   Acute left upper and lower lobar and segmental pulmonary emboli. No   evidence of right heart strain.     CTAP  No CT evidence of acute intra-abdominal pathology. No nephrolithiasis or   hydronephrosis bilaterally.    Patient received heparin, zofran in the ED. Admitted to medicine for management.   He was transitioned to Eliquis for treatment of the PE. Echo showed normal left ventricular size and wall thicknesses, with normal systolic and diastolic function. LV Ejection Fraction by Alan's Method with a biplane EF of 64 %.    Patient is medically stable and ready for discharge.

## 2023-11-11 NOTE — DISCHARGE NOTE NURSING/CASE MANAGEMENT/SOCIAL WORK - PATIENT PORTAL LINK FT
You can access the FollowMyHealth Patient Portal offered by [facility] by registering at the following website: [email or web address]. By joining [employer]’s FollowMyHealth portal, you will also be able to view your health information using other applications (apps) compatible with our system.

## 2023-11-11 NOTE — DISCHARGE NOTE NURSING/CASE MANAGEMENT/SOCIAL WORK - NSDCPEFALRISK_GEN_ALL_CORE
For information on Fall & Injury Prevention, visit: [email or web address] OR  [email or web address] OR  https://www.cdc.gov/steadi/patient.html

## 2023-11-11 NOTE — DISCHARGE NOTE PROVIDER - NSDCFUADDINST_GEN_ALL_CORE_FT
Based on vascular surgery, we would like to give you anticoagulation for a total duration of 6 months. Based on vascular surgery, we would like to give you anticoagulation for a total duration of 6 months. Please follow up with your PCP.

## 2023-11-14 DIAGNOSIS — I26.99 OTHER PULMONARY EMBOLISM WITHOUT ACUTE COR PULMONALE: ICD-10-CM

## 2023-11-14 DIAGNOSIS — I82.4Y2 ACUTE EMBOLISM AND THROMBOSIS OF UNSPECIFIED DEEP VEINS OF LEFT PROXIMAL LOWER EXTREMITY: ICD-10-CM

## 2023-11-14 DIAGNOSIS — Z41.8 ENCOUNTER FOR OTHER PROCEDURES FOR PURPOSES OTHER THAN REMEDYING HEALTH STATE: ICD-10-CM

## 2023-11-14 DIAGNOSIS — M10.9 GOUT, UNSPECIFIED: ICD-10-CM

## 2023-11-14 DIAGNOSIS — Z87.891 PERSONAL HISTORY OF NICOTINE DEPENDENCE: ICD-10-CM

## 2023-11-14 DIAGNOSIS — E11.9 TYPE 2 DIABETES MELLITUS WITHOUT COMPLICATIONS: ICD-10-CM

## 2025-02-25 NOTE — ED ADULT NURSE NOTE - SUICIDE SCREENING QUESTION 1
Writer receiving call from radiologist regarding MRA VWI. 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 There is also a couple of areas of R ICA and MCA not well visualized but recommending further evaluation with CTA head/neck. New when compared to CTA from 1/22/25.    Pt no longer at appt. Attempted to call pt to inquire about any new sx. No answer, LVM w/ call back number. Has plavix listed in med list (managed by cardiology, Dr. Rivas).    Will route to [facility] NP's/Dr. Gonzalez.      No